# Patient Record
Sex: FEMALE | Race: WHITE | Employment: OTHER | URBAN - METROPOLITAN AREA
[De-identification: names, ages, dates, MRNs, and addresses within clinical notes are randomized per-mention and may not be internally consistent; named-entity substitution may affect disease eponyms.]

---

## 2017-03-03 ENCOUNTER — TRANSFERRED RECORDS (OUTPATIENT)
Dept: HEALTH INFORMATION MANAGEMENT | Facility: CLINIC | Age: 62
End: 2017-03-03

## 2017-03-03 LAB
CHOLEST SERPL-MCNC: 214 MG/DL (ref 0–199)
GLUCOSE SERPL-MCNC: 100 MG/DL (ref 70–100)
HDLC SERPL-MCNC: 69 MG/DL
HPV ABSTRACT: NORMAL
LDLC SERPL CALC-MCNC: 121 MG/DL (ref 19–130)
PAP-ABSTRACT: NORMAL
TRIGL SERPL-MCNC: 121 MG/DL (ref 4–149)

## 2018-03-20 ENCOUNTER — TELEPHONE (OUTPATIENT)
Dept: OTHER | Facility: CLINIC | Age: 63
End: 2018-03-20

## 2018-03-20 ENCOUNTER — TRANSFERRED RECORDS (OUTPATIENT)
Dept: HEALTH INFORMATION MANAGEMENT | Facility: CLINIC | Age: 63
End: 2018-03-20

## 2018-03-20 NOTE — TELEPHONE ENCOUNTER
3/20/2018    Call Regarding [employer] OTHER     Attempt 1    Message on voicemail     Comments:       Outreach   SB

## 2018-04-09 NOTE — TELEPHONE ENCOUNTER
4/9/2018    Call Regarding [employer] OTHER    Attempt 2    Message on voicemail     Comments:           Outreach   AT

## 2018-04-19 ENCOUNTER — TELEPHONE (OUTPATIENT)
Dept: FAMILY MEDICINE | Facility: CLINIC | Age: 63
End: 2018-04-19

## 2018-04-19 ENCOUNTER — OFFICE VISIT (OUTPATIENT)
Dept: FAMILY MEDICINE | Facility: CLINIC | Age: 63
End: 2018-04-19
Payer: COMMERCIAL

## 2018-04-19 VITALS
SYSTOLIC BLOOD PRESSURE: 120 MMHG | HEART RATE: 74 BPM | HEIGHT: 65 IN | TEMPERATURE: 98 F | OXYGEN SATURATION: 98 % | DIASTOLIC BLOOD PRESSURE: 70 MMHG | WEIGHT: 131 LBS | RESPIRATION RATE: 16 BRPM | BODY MASS INDEX: 21.83 KG/M2

## 2018-04-19 DIAGNOSIS — H53.8 CLOUDY VISION: ICD-10-CM

## 2018-04-19 DIAGNOSIS — M85.80 OSTEOPENIA, UNSPECIFIED LOCATION: ICD-10-CM

## 2018-04-19 DIAGNOSIS — L30.9 ECZEMA, UNSPECIFIED TYPE: ICD-10-CM

## 2018-04-19 DIAGNOSIS — M54.16 LUMBAR RADICULOPATHY: ICD-10-CM

## 2018-04-19 DIAGNOSIS — R42 DIZZINESS: ICD-10-CM

## 2018-04-19 DIAGNOSIS — Z12.4 SCREENING FOR MALIGNANT NEOPLASM OF CERVIX: ICD-10-CM

## 2018-04-19 DIAGNOSIS — Z11.59 NEED FOR HEPATITIS C SCREENING TEST: ICD-10-CM

## 2018-04-19 DIAGNOSIS — Z78.0 MENOPAUSE: ICD-10-CM

## 2018-04-19 DIAGNOSIS — Z00.00 ENCOUNTER FOR ROUTINE ADULT HEALTH EXAMINATION WITHOUT ABNORMAL FINDINGS: Primary | ICD-10-CM

## 2018-04-19 DIAGNOSIS — K59.09 CHRONIC CONSTIPATION: ICD-10-CM

## 2018-04-19 LAB
CHOLEST SERPL-MCNC: 218 MG/DL
GLUCOSE SERPL-MCNC: 92 MG/DL (ref 70–99)
HCV AB SERPL QL IA: NONREACTIVE
HDLC SERPL-MCNC: 75 MG/DL
HIV 1+2 AB+HIV1 P24 AG SERPL QL IA: NONREACTIVE
LDLC SERPL CALC-MCNC: 123 MG/DL
NONHDLC SERPL-MCNC: 143 MG/DL
TRIGL SERPL-MCNC: 101 MG/DL
TSH SERPL DL<=0.005 MIU/L-ACNC: 2.96 MU/L (ref 0.4–4)

## 2018-04-19 PROCEDURE — 86803 HEPATITIS C AB TEST: CPT | Performed by: FAMILY MEDICINE

## 2018-04-19 PROCEDURE — 84443 ASSAY THYROID STIM HORMONE: CPT | Performed by: FAMILY MEDICINE

## 2018-04-19 PROCEDURE — 80061 LIPID PANEL: CPT | Performed by: FAMILY MEDICINE

## 2018-04-19 PROCEDURE — 99396 PREV VISIT EST AGE 40-64: CPT | Performed by: FAMILY MEDICINE

## 2018-04-19 PROCEDURE — 36415 COLL VENOUS BLD VENIPUNCTURE: CPT | Performed by: FAMILY MEDICINE

## 2018-04-19 PROCEDURE — 87389 HIV-1 AG W/HIV-1&-2 AB AG IA: CPT | Performed by: FAMILY MEDICINE

## 2018-04-19 PROCEDURE — 82947 ASSAY GLUCOSE BLOOD QUANT: CPT | Performed by: FAMILY MEDICINE

## 2018-04-19 RX ORDER — TRIAMCINOLONE ACETONIDE 1 MG/G
CREAM TOPICAL
Qty: 80 G | Refills: 0 | Status: SHIPPED | OUTPATIENT
Start: 2018-04-19 | End: 2021-10-20

## 2018-04-19 NOTE — PATIENT INSTRUCTIONS
"Goal of 1200 mg calcium daily. Supplement what you are not getting in your diet.    Please call [facility] (formerly called [facility]) at [phone] to schedule dexa bone scan and with ophthalmologist.    Call your insurance to discuss coverage of Shingrix (shingles vaccine). Return for nurse only visit if you are covered.    Start powdered fiber such as Metamucil or Citricel: start 1 tsp per day, and increase by 1 tsp per week to goal total dosing of 1-2 tablespoons per day. Drink plenty of water daily for fiber to be effective.     Google \"Eply maneuver\" to help with dizziness.      Preventive Health Recommendations  Female Ages 50 - 64    Yearly exam: See your health care provider every year in order to  o Review health changes.   o Discuss preventive care.    o Review your medicines if your doctor has prescribed any.      Get a Pap test every three years (unless you have an abnormal result and your provider advises testing more often).    If you get Pap tests with HPV test, you only need to test every 5 years, unless you have an abnormal result.     You do not need a Pap test if your uterus was removed (hysterectomy) and you have not had cancer.    You should be tested each year for STDs (sexually transmitted diseases) if you're at risk.     Have a mammogram every 1 to 2 years.    Have a colonoscopy at age 50, or have a yearly FIT test (stool test). These exams screen for colon cancer.      Have a cholesterol test every 5 years, or more often if advised.    Have a diabetes test (fasting glucose) every three years. If you are at risk for diabetes, you should have this test more often.     If you are at risk for osteoporosis (brittle bone disease), think about having a bone density scan (DEXA).    Shots: Get a flu shot each year. Get a tetanus shot every 10 years.    Nutrition:     Eat at least 5 servings of fruits and vegetables each day.    Eat whole-grain " bread, whole-wheat pasta and brown rice instead of white grains and rice.    Talk to your provider about Calcium and Vitamin D.     Lifestyle    Exercise at least 150 minutes a week (30 minutes a day, 5 days a week). This will help you control your weight and prevent disease.    Limit alcohol to one drink per day.    No smoking.     Wear sunscreen to prevent skin cancer.     See your dentist every six months for an exam and cleaning.    See your eye doctor every 1 to 2 years.

## 2018-04-19 NOTE — TELEPHONE ENCOUNTER
RE: kenalog cream    Question on the area patient will be applying the cream  to calculate daily supply    Thank you     [phone]

## 2018-04-19 NOTE — MR AVS SNAPSHOT
"              After Visit Summary   4/19/2018    Елена Mix    MRN: [id number]           Patient Information     Date Of Birth          1955        Visit Information        Provider Department      4/19/2018 7:40 AM Jana Ramirez MD [facility]        Today's Diagnoses     Encounter for routine adult health examination without abnormal findings    -  1    Screening for malignant neoplasm of cervix        Need for hepatitis C screening test        Osteopenia, unspecified location        Dizziness        Chronic constipation        Lumbar radiculopathy        Cloudy vision        Eczema, unspecified type        Menopause          Care Instructions    Goal of 1200 mg calcium daily. Supplement what you are not getting in your diet.    Please call [facility] (formerly called [facility]) at [phone] to schedule dexa bone scan and with ophthalmologist.    Call your insurance to discuss coverage of Shingrix (shingles vaccine). Return for nurse only visit if you are covered.    Start powdered fiber such as Metamucil or Citricel: start 1 tsp per day, and increase by 1 tsp per week to goal total dosing of 1-2 tablespoons per day. Drink plenty of water daily for fiber to be effective.     Google \"Eply maneuver\" to help with dizziness.      Preventive Health Recommendations  Female Ages 50 - 64    Yearly exam: See your health care provider every year in order to  o Review health changes.   o Discuss preventive care.    o Review your medicines if your doctor has prescribed any.      Get a Pap test every three years (unless you have an abnormal result and your provider advises testing more often).    If you get Pap tests with HPV test, you only need to test every 5 years, unless you have an abnormal result.     You do not need a Pap test if your uterus was removed (hysterectomy) and you have not had cancer.    You should be tested each " year for STDs (sexually transmitted diseases) if you're at risk.     Have a mammogram every 1 to 2 years.    Have a colonoscopy at age 50, or have a yearly FIT test (stool test). These exams screen for colon cancer.      Have a cholesterol test every 5 years, or more often if advised.    Have a diabetes test (fasting glucose) every three years. If you are at risk for diabetes, you should have this test more often.     If you are at risk for osteoporosis (brittle bone disease), think about having a bone density scan (DEXA).    Shots: Get a flu shot each year. Get a tetanus shot every 10 years.    Nutrition:     Eat at least 5 servings of fruits and vegetables each day.    Eat whole-grain bread, whole-wheat pasta and brown rice instead of white grains and rice.    Talk to your provider about Calcium and Vitamin D.     Lifestyle    Exercise at least 150 minutes a week (30 minutes a day, 5 days a week). This will help you control your weight and prevent disease.    Limit alcohol to one drink per day.    No smoking.     Wear sunscreen to prevent skin cancer.     See your dentist every six months for an exam and cleaning.    See your eye doctor every 1 to 2 years.            Follow-ups after your visit        Additional Services     OPHTHALMOLOGY ADULT REFERRAL       Your provider has referred you to: [facility]: [facility] [place] [phone]   [email or web address]    Please be aware that coverage of these services is subject to the terms and limitations of your health insurance plan.  Call member services at your health plan with any benefit or coverage questions.      Please bring the following with you to your appointment:    (1) Any X-Rays, CTs or MRIs which have been performed.  Contact the facility where they were done to arrange for  prior to your scheduled appointment.    (2) List of current medications  (3) This referral request   (4)  "Any documents/labs given to you for this referral                  Future tests that were ordered for you today     Open Future Orders        Priority Expected Expires Ordered    DX Hip/Pelvis/Spine Routine  4/19/2019 4/19/2018            Who to contact     If you have questions or need follow up information about today's clinic visit or your schedule please contact [facility] directly at [phone].  Normal or non-critical lab and imaging results will be communicated to you by brick&mobilehart, letter or phone within 4 business days after the clinic has received the results. If you do not hear from us within 7 days, please contact the clinic through [employer]t or phone. If you have a critical or abnormal lab result, we will notify you by phone as soon as possible.  Submit refill requests through [employer] or call your pharmacy and they will forward the refill request to us. Please allow 3 business days for your refill to be completed.          Additional Information About Your Visit        brick&mobilehart Information     [employer] gives you secure access to your electronic health record. If you see a primary care provider, you can also send messages to your care team and make appointments. If you have questions, please call your primary care clinic.  If you do not have a primary care provider, please call [phone] and they will assist you.        Care EveryWhere ID     This is your Care EveryWhere ID. This could be used by other organizations to access your [facility] medical records  [id number]        Your Vitals Were     Pulse Temperature Respirations Height Pulse Oximetry Breastfeeding?    74 98  F (36.7  C) (Oral) 16 1.651 m (5' 5\") 98% No    BMI (Body Mass Index)                   21.8 kg/m2            Blood Pressure from Last 3 Encounters:   04/19/18 120/70   04/26/16 (!) 153/103   04/12/16 135/83    Weight from Last 3 Encounters:   04/19/18 59.4 kg (131 lb)   04/12/16 59 kg (130 lb)   02/18/16 59 kg (130 lb) "              We Performed the Following     Glucose     Hepatitis C Screen Reflex to HCV RNA Quant and Genotype     HIV Antigen Antibody Combo     Lipid panel reflex to direct LDL Fasting     OPHTHALMOLOGY ADULT REFERRAL     TSH with free T4 reflex          Today's Medication Changes          These changes are accurate as of 4/19/18  8:40 AM.  If you have any questions, ask your nurse or doctor.               Start taking these medicines.        Dose/Directions    triamcinolone 0.1 % cream   Commonly known as:  KENALOG   Used for:  Eczema, unspecified type   Started by:  Jana Ramirez MD        Apply sparingly to affected area three times daily as needed   Quantity:  80 g   Refills:  0            Where to get your medicines      These medications were sent to [facility] [address]  [address] [facility] [address]     Phone:  [phone]     triamcinolone 0.1 % cream                Primary Care Provider Office Phone # Fax #    Jana Ramirez [phone] [address]  [facility] [address]        Equal Access to Services     [facility]: Hadii aad ku hadasho Soomaali, waaxda luqadaha, qaybta kaalmada adeegyada, waxtessa gao . So [facility] [phone].    ATENCIÓN: Si habla español, tiene a smith disposición servicios gratuitos de asistencia lingüística. [facility] [phone].    We comply with applicable federal civil rights laws and [place] laws. We do not discriminate on the basis of race, color, national origin, age, disability, sex, sexual orientation, or gender identity.            Thank you!     Thank you for choosing [facility]  for your care. Our goal is always to provide you with excellent care. Hearing back from our patients is one way we can continue to improve our services. Please take a few minutes to complete the written survey that you may receive in the  mail after your visit with us. Thank you!             Your Updated Medication List - Protect others around you: Learn how to safely use, store and throw away your medicines at www.disposemymeds.org.          This list is accurate as of 4/19/18  8:40 AM.  Always use your most recent med list.                   Brand Name Dispense Instructions for use Diagnosis    DAILY MULTIVITAMIN PO           fluticasone 50 MCG/ACT spray    FLONASE     Spray 2 sprays into both nostrils as needed for rhinitis or allergies        triamcinolone 0.1 % cream    KENALOG    80 g    Apply sparingly to affected area three times daily as needed    Eczema, unspecified type       VITAMIN D3 PO      Take by mouth daily

## 2018-04-19 NOTE — TELEPHONE ENCOUNTER
"Called and spoke with pharmacy. They report due to insurance purposes they need to know what parts of the body patient is applying the Kenalog cream to. Writer explained it does not specify specific body parts/areas that patient is applying to, just \"apply to affected areas sparingly\". They report that they will call patient to ask what parts of her body she is applying to. They have no further questions or concerns and reporting that [facility] does not need to do anything further. Closing encounter.    Vonda Maldonado RN    "

## 2018-04-19 NOTE — PROGRESS NOTES
SUBJECTIVE:   CC: Елена Mix is an 62 year old woman who presents for preventive health visit.     Healthy Habits:  Answers for HPI/ROS submitted by the patient on 4/17/2018   Annual Exam:  Getting at least 3 servings of Calcium per day:: Yes  Bi-annual eye exam:: Yes  Dental care twice a year:: Yes  Sleep apnea or symptoms of sleep apnea:: Daytime drowsiness  Diet:: Regular (no restrictions)  Frequency of exercise:: 4-5 days/week  Taking medications regularly:: Not Applicable  Medication side effects:: Not applicable  Additional concerns today:: YES  PHQ-2 Score: 0  Duration of exercise:: 15-30 minutes      Pt was following with Rosa Hilton over the last year due to insurance change. Last physical was 3/3/17- reviewed note with patient through care everywhere.     Past/recent records reviewed and discussed for --   -Colonoscopy completed 2016. Due for in f/u 5 years (2021)  -She completed mammogram in March 2018.  -vaccines. Pt [facility] unsure of when but plans to call and ask for records.  -previous Glucose was 100. Denies family hx of diabetes. Pt admits to eating a higher amount of carbs.   -Menopause at age 41.    BP: pt reports with last physical she was very stressed which is likely why BP was elevated. She checked her BP daily for the following 10 days and noticed it was not elevated. Her job is very stressful as well and thinks BP might be elevated at work. She is planning to retire soon.  BP Readings from Last 3 Encounters:   04/19/18 120/70   04/26/16 (!) 153/103   04/12/16 135/83       Weight: exercising 4-5 days per week. She thinks this will increase in the summer.   Wt Readings from Last 5 Encounters:   04/19/18 59.4 kg (131 lb)   04/12/16 59 kg (130 lb)   02/18/16 59 kg (130 lb)   09/21/15 59.4 kg (130 lb 14.4 oz)   02/10/15 59.9 kg (132 lb)       GI: stress causes changes in bm's-constipated. To manage constipation she increases veggies  "in diet and drinks 1-2 \"mugs\" of herbal tea which stimulated bm's.    Skin: requests refill of steroid cream last prescribed several years ago. pt has had issues with dry skin that cause itching. She was given triamcinolone acetonide cream (USP 0.1%, 80 mg) which helps with sx's. Pt has hx of eczema. She also has significant amount of swelling and itching with mosquito bites and this cream helps with that.    Others:   -Back pain is okay. She followed with PT and is doing home exercises regularly. Also doing yoga which helps.   -Has developed floaters and cloudy vision. Last eye apt was with optometry and she would like referral for ophthalmology.   -Feels dizzy (off-balance, spinning) during certain movements during yoga (usually with turning her head. Will have to stop and pause for a second and the symptoms resolve.  -Supplementing multivitamin and vit D. She is drinking milk and eating Greek yogurt and cheese regularly.   -Drinking 2 glasses of wine per night after work due to stress.  -Allergies are well controlled with Flonase.         Today's PHQ-2 Score:   PHQ-2 ( 1999 Pfizer) 4/17/2018 9/21/2015   Q1: Little interest or pleasure in doing things 0 0   Q2: Feeling down, depressed or hopeless 0 0   PHQ-2 Score 0 0   Q1: Little interest or pleasure in doing things Not at all -   Q2: Feeling down, depressed or hopeless Not at all -   PHQ-2 Score 0 -       Abuse: Current or Past(Physical, Sexual or Emotional)- No  Do you feel safe in your environment - Yes    Social History   Substance Use Topics     Smoking status: Never Smoker     Smokeless tobacco: Never Used     Alcohol use Yes      Comment: 1-2 glasses wine in evening, approx 5 x's a week     If you drink alcohol do you typically have >3 drinks per day or >7 drinks per week? 1-2 glass a day.                      Reviewed orders with patient.  Reviewed health maintenance and updated orders accordingly - Yes  Labs reviewed in EPIC  BP Readings from Last 3 " Encounters:   04/19/18 120/70   04/26/16 (!) 153/103   04/12/16 135/83    Wt Readings from Last 3 Encounters:   04/19/18 59.4 kg (131 lb)   04/12/16 59 kg (130 lb)   02/18/16 59 kg (130 lb)                  Patient Active Problem List   Diagnosis     Osteopenia     Seasonal allergic rhinitis     Chronic constipation     Lumbar radiculopathy     Advanced directives, counseling/discussion     CARDIOVASCULAR SCREENING; LDL GOAL LESS THAN 130     Eczema, unspecified type     Past Surgical History:   Procedure Laterality Date     COLONOSCOPY N/A 2/26/2016    Procedure: COLONOSCOPY;  Surgeon: Mar Frye MD;  Location: [facility] OR     COLONOSCOPY WITH CO2 INSUFFLATION N/A 2/26/2016    Procedure: COLONOSCOPY WITH CO2 INSUFFLATION;  Surgeon: Mar Frye MD;  Location: [facility] OR     SHOULDER SURGERY  1985?    right shoulder, instability      TONSILLECTOMY & ADENOIDECTOMY  age 5     TUBAL LIGATION         Social History   Substance Use Topics     Smoking status: Never Smoker     Smokeless tobacco: Never Used     Alcohol use Yes      Comment: 1-2 glasses wine in evening, approx 5 x's a week     Family History   Problem Relation Age of Onset     Respiratory Mother      emphysema      HEART DISEASE Father      cardiomyopathy, thought not heart attack.      Arthritis Brother      rheumatoid     Hyperlipidemia Brother      Arthritis Sister      osteoarthritis     Hypertension Sister      Hyperlipidemia Sister            Patient over age 50, mutual decision to screen reflected in health maintenance.    Pertinent mammograms are reviewed under the imaging tab.  History of abnormal Pap smear: NO - age 30-65 PAP every 5 years with negative HPV co-testing recommended    Reviewed and updated as needed this visit by clinical staff  Tobacco  Allergies  Meds  Problems  Med Hx  Surg Hx  Fam Hx  Soc Hx          Reviewed and updated as needed this visit by Provider Tobacco  Allergies  Meds  Med Hx  Surg Hx  Fam Hx  Soc Hx  "  Allergies  Meds  Problems            ROS:   ROS: 10 point ROS neg other than the symptoms noted above in the HPI.    This document serves as a record of the services and decisions personally performed and made by Jana Ramirez MD. It was created on her behalf by Camelia Guajardo, a trained medical scribe. The creation of this document is based the provider's statements to the medical scribe.  Camelia Guajardo April 19, 2018 7:58 AM      OBJECTIVE:   /70 (BP Location: Right arm, Patient Position: Sitting, Cuff Size: Adult Regular)  Pulse 74  Temp 98  F (36.7  C) (Oral)  Resp 16  Ht 1.651 m (5' 5\")  Wt 59.4 kg (131 lb)  SpO2 98%  Breastfeeding? No  BMI 21.8 kg/m2  EXAM:  GENERAL APPEARANCE: healthy, alert and no distress  EYES: Eyes grossly normal to inspection, PERRL and conjunctivae and sclerae normal  HENT: ear canals and TM's normal, nose and mouth without ulcers or lesions, oropharynx clear and oral mucous membranes moist  NECK: no adenopathy, no asymmetry, masses, or scars and thyroid normal to palpation  RESP: lungs clear to auscultation - no rales, rhonchi or wheezes  BREAST: normal without masses, tenderness or nipple discharge and no palpable axillary masses or adenopathy  CV: regular rate and rhythm, normal S1 S2, no S3 or S4, no murmur, click or rub, no peripheral edema and peripheral pulses strong  ABDOMEN: soft, nontender, no hepatosplenomegaly, no masses and bowel sounds normal   (female): normal female external genitalia, normal urethral meatus, vaginal mucosal atrophy noted, normal cervix, adnexae, and uterus without masses or abnormal discharge  SKIN: no suspicious lesions or rashes  PSYCH: mentation appears normal and affect normal/bright    No results found for this or any previous visit (from the past 24 hour(s)).    Progress Notes  - in this encounter    Table of Contents for Progress Notes  Yaneli Burrell RN - 03/13/2017 10:04 AM CDT  Annie Coelho MD - " 03/03/2017 12:16 PM Annie Mckeon MD - 02/28/2017 10:04 AM Yaneli Osborn RN - 03/13/2017 10:04 AM CDT  Quick Note:       Dear Enedelia,     I am writing to let you know that your Pap and HPV result is negative. This means that your test result was normal. No cancer or pre-cancerous cells were seen.     Based on current cervical cancer screening recommendations, your next Pap and HPV should be in 3 years.     Continue to schedule your annual preventive exams for your overall health.     If you have questions about cervical cancer screening or your test results, call [phone].     Sincerely,     Yaneli Burrell, RN on behalf of Dr. Salome Moore, Medical Director [facility]erv[facility]   ______       Quick Note:     [facility] Summary:Risk for a heart attack or stroke in the next 10 years is 4.96% (using the ACC/AHA ASCVD risk score) based on age 61, female gender, /88 mm Hg, not on BP medication, total cholesterol 214 mg/dl, HDL 69 mg/dl, diabetes diagnosis not identified, current tobacco use not identified. Cardiovascular risk could be reduced with attention to the following, in order of priority: lowering blood pressure;     ______       Annie Coelho MD - 02/28/2017 10:04 AM CST  Formatting of this note may be different from the original.  Preventive Exam & Pelvic     SUBJECTIVE:   61 y.o.female presents for a routine preventive physical exam.    Concerns:    Osteopenia  DEXA: 5/14  Lumbar -1.6, total hip -1.6, femoral neck -1.6/-1.7  FRAX 8%-major osteoporotic fracture, 0.9% hip  Previously on fosamax Q2rehuj d/c in 2014.   On ca/vit d, exercises with resistance training, not a smoke.     RLE radiculopathy--LD-S1 epidural steroid injection done 4/26/16  -with exercise and pilates not having issues. Some numbness chronically at lateral right foot.   MRI 4/16  -sciatica/LBP, right leg pain, right foot numbness  L1-L5 mild to minimal disc bulge  Mild  "multilevel degenerative disc disease, no acute disc protrusion/extrusion  Subtle left paracental and foraminal annular tear and mild protrusion without significant foraminal compromise at L5/S1    BP  Outside of the office 120-130s, never been told bp elevated  Under a Lot of stress at work.     Insomnia  After return from [place] 3 weeks ago  Averaging 3 hours of sleep/night for a couple weeks. Waking frequently, difficulty staying asleep  Originally tried OTC sleep aid--made dizzy, groggy  Worked on sleep hygeine, started melatonin--resolved; doing some meditation  Stress level improved right now.     GYN    Menopause in   No h/o abnormal pap  Last pap .     Allergies   Allergen Reactions     Other     No outpatient prescriptions prior to visit.     No facility-administered medications prior to visit.     Review of Systems:   Other than what is mentioned above, the remainder of complete review of systems is negative.    OBJECTIVE:   Vital Signs: /88 mmHg  Pulse 67  Ht 5' 4.5\" (163.8 cm)  Wt 126 lb (59443 g)  BMI 21.30 kg/m2 Estimated body mass index is 21.3 kg/(m^2) as calculated from the following:  Height as of this encounter: 5' 4.5\" (163.8 cm).  Weight as of this encounter: 126 lb (35570 g).    General: Patient alert, in NAD.   HEENT: PERRLA. Bilateral TM's, external canals, oropharynx normal.   Neck: Supple, without thyromegaly or mass.   Upper Extremities: FROM with good strength, no lesions or deformities.   CV: RRR without murmurs, rubs or gallops.   Resp: Clear to auscultation without crackles, wheezes or distress.   Abdomen: Soft, non-tender, without hepatosplenomegaly, masses, or hernias.   Breasts: Symmetrical, nontender, without masses or nipple discharge.   Lymphatic: No neck, supraclavicular, axillary or groin lymphadenopathy.   Lower Extremities: FROM, normal gait without edema, lesions, or deformity.   Pelvic: External normal without lesions. Vagina reveals healthy mucosa with " no vaginal or cervical lesions, and no abnormal discharge. Pap collected. Bimanual reveals uterus to be mobile, normal size, shape and consistency. No adenexal masses or tenderness.  Rectal: Not examined.   Skin: No lesions.   Neuro: CN II-XII, motor & sensory function all intact.   Psychiatric: Alert & oriented with normal affect and insight. Patient does not appear depressed or anxious.     ASSESSMENT:   Routine preventive exam.    PLAN:   Follow-up in 1 year, sooner PRN any concerns.  Prescriptions provided, see OP Med list on Health Profile in Epic Medication List.    Screening Labs:   Cholesterol, glucose    Preventive health counseling provided:   Recent ACOG Pap smear guideline changes reviewed with patient. Pap smear done. Last Pap smear done 4/13     Mammography screening: ordered    Colon cancer screening: Colonoscopy 2/16--polyp, repeat 5 years    BMD: repeat DEXA in 2-3 years; osteopenia-discussed ca/vit D, exercise    Calcium intake reviewed. Strategies to achieve 1200 to 1500 milligrams of calcium intake per day reviewed.  Regular exercise and healthy diet encouraged.    Insomnia  Discussed sleep hygiene, melatonin, meditation.    Annie Coelho MD      Plan of Treatment  - as of this encounter    ASSESSMENT/PLAN:   1. Encounter for routine adult health examination without abnormal findings  - HIV Antigen Antibody Combo  - Lipid panel reflex to direct LDL Fasting  - Glucose    2. Screening for malignant neoplasm of cervix  utd    3. Need for hepatitis C screening test  - Hepatitis C Screen Reflex to HCV RNA Quant and Genotype    4. Osteopenia, unspecified location   reviewed goal of 1200 mg calcium daily. Pt is to f/u with dexa scan  - TSH with free T4 reflex  - DX Hip/Pelvis/Spine; Future    5. Dizziness   suspect positional vertigo. Advised trial Eply maneuver at home. If this does not help pt is to return for problem focused visit.  - TSH with free T4 reflex    6. Chronic constipation    "advised starting daily fiber     7. Lumbar radiculopathy   controlled with home PT and yoga    8. Cloudy vision  pt is to f/u with ophthalmology.   - OPHTHALMOLOGY ADULT REFERRAL    9. Eczema, unspecified type   Controlled. Continue same medication.   - triamcinolone (KENALOG) 0.1 % cream; Apply sparingly to affected area three times daily as needed  Dispense: 80 g; Refill: 0    10. Menopause  as above #4. Pt is to f/u with dexa scan.  - DX Hip/Pelvis/Spine; Future    Discussed shingrix vaccine with pt. They will return later this year to receive the vaccine.      Patient Instructions   Goal of 1200 mg calcium daily. Supplement what you are not getting in your diet.    Please call [facility] ([facility] at [phone] to schedule dexa bone scan and with ophthalmologist.    Call your insurance to discuss coverage of Shingrix (shingles vaccine). Return for nurse only visit if you are covered.    Start powdered fiber such as Metamucil or Citricel: start 1 tsp per day, and increase by 1 tsp per week to goal total dosing of 1-2 tablespoons per day. Drink plenty of water daily for fiber to be effective.     Google \"Eply maneuver\" to help with dizziness.      Preventive Health Recommendations  Female Ages 50 - 64    Yearly exam: See your health care provider every year in order to  o Review health changes.   o Discuss preventive care.    o Review your medicines if your doctor has prescribed any.      Get a Pap test every three years (unless you have an abnormal result and your provider advises testing more often).    If you get Pap tests with HPV test, you only need to test every 5 years, unless you have an abnormal result.     You do not need a Pap test if your uterus was removed (hysterectomy) and you have not had cancer.    You should be tested each year for STDs (sexually transmitted diseases) if you're at risk.     Have a mammogram every 1 to 2 " "years.    Have a colonoscopy at age 50, or have a yearly FIT test (stool test). These exams screen for colon cancer.      Have a cholesterol test every 5 years, or more often if advised.    Have a diabetes test (fasting glucose) every three years. If you are at risk for diabetes, you should have this test more often.     If you are at risk for osteoporosis (brittle bone disease), think about having a bone density scan (DEXA).    Shots: Get a flu shot each year. Get a tetanus shot every 10 years.    Nutrition:     Eat at least 5 servings of fruits and vegetables each day.    Eat whole-grain bread, whole-wheat pasta and brown rice instead of white grains and rice.    Talk to your provider about Calcium and Vitamin D.     Lifestyle    Exercise at least 150 minutes a week (30 minutes a day, 5 days a week). This will help you control your weight and prevent disease.    Limit alcohol to one drink per day.    No smoking.     Wear sunscreen to prevent skin cancer.     See your dentist every six months for an exam and cleaning.    See your eye doctor every 1 to 2 years.          COUNSELING:   Reviewed preventive health counseling, as reflected in patient instructions       Regular exercise       Healthy diet/nutrition       Vision screening       Hearing screening       Immunizations         Alcohol Use       Osteoporosis Prevention/Bone Health       Colon cancer screening       Consider Hep C screening for patients born between 1945 and 1965       HIV screeninx in teen years, 1x in adult years, and at intervals if high risk         reports that she has never smoked. She has never used smokeless tobacco.    Estimated body mass index is 21.8 kg/(m^2) as calculated from the following:    Height as of this encounter: 1.651 m (5' 5\").    Weight as of this encounter: 59.4 kg (131 lb).       Counseling Resources:  ATP IV Guidelines  Pooled Cohorts Equation Calculator  Breast Cancer Risk Calculator  FRAX Risk Assessment  ICSI " Preventive Guidelines  Dietary Guidelines for Americans, 2010  USDA's MyPlate  ASA Prophylaxis  Lung CA Screening    Length of visit was 28 minutes with more than 50 percent of that time used for discussing medical concerns and education    The information in this document, created by the medical scribe for me, accurately reflects the services I personally performed and the decisions made by me. I have reviewed and approved this document for accuracy.   MD Jana Crawford MD  [facility]

## 2018-04-19 NOTE — Clinical Note
Please abstract the following data from this visit with this patient into the appropriate field in Epic:  Pap smear done on this date: 3/3/17 (approximately), by this group: [facility], results were normal.

## 2018-05-04 ENCOUNTER — ALLIED HEALTH/NURSE VISIT (OUTPATIENT)
Dept: NURSING | Facility: CLINIC | Age: 63
End: 2018-05-04
Payer: COMMERCIAL

## 2018-05-04 DIAGNOSIS — Z23 NEED FOR ZOSTAVAX ADMINISTRATION: Primary | ICD-10-CM

## 2018-05-04 PROCEDURE — 99207 ZZC NO CHARGE NURSE ONLY: CPT

## 2018-05-04 PROCEDURE — 90471 IMMUNIZATION ADMIN: CPT

## 2018-05-04 PROCEDURE — 90750 HZV VACC RECOMBINANT IM: CPT

## 2018-05-04 NOTE — NURSING NOTE
Screening Questionnaire for Adult Immunization    Are you sick today?   No   Do you have allergies to medications, food, a vaccine component or latex?   No   Have you ever had a serious reaction after receiving a vaccination?   No   Do you have a long-term health problem with heart disease, lung disease, asthma, kidney disease, metabolic disease (e.g. diabetes), anemia, or other blood disorder?   No   Do you have cancer, leukemia, HIV/AIDS, or any other immune system problem?   No   In the past 3 months, have you taken medications that affect  your immune system, such as prednisone, other steroids, or anticancer drugs; drugs for the treatment of rheumatoid arthritis, Crohn s disease, or psoriasis; or have you had radiation treatments?   No   Have you had a seizure, or a brain or other nervous system problem?   No   During the past year, have you received a transfusion of blood or blood     products, or been given immune (gamma) globulin or antiviral drug?   No   For women: Are you pregnant or is there a chance you could become        pregnant during the next month?   No   Have you received any vaccinations in the past 4 weeks?   No     Immunization questionnaire answers were all negative.   Patient states having seasonal allergies, but not the common cold.     Patient instructed to remain in clinic for 15 minutes afterwards, and to report any adverse reaction to me immediately.       Screening performed by Camille Evans on 5/4/2018 at 9:01 AM.

## 2018-05-04 NOTE — MR AVS SNAPSHOT
After Visit Summary   5/4/2018    Елена Mix    MRN: [id number]           Patient Information     Date Of Birth          1955        Visit Information        Provider Department      5/4/2018 8:40 AM [facility]        Today's Diagnoses     Need for Zostavax administration    -  1       Follow-ups after your visit        Your next 10 appointments already scheduled     Jun 12, 2018  9:15 AM CDT   New Visit with Marv Bolaños MD,  [facility] NURSE ONLY   [facility]    [id number] [place] [id number] [phone]              Who to contact     If you have questions or need follow up information about today's clinic visit or your schedule please contact [facility] directly at [phone].  Normal or non-critical lab and imaging results will be communicated to you by MyChart, letter or phone within 4 business days after the clinic has received the results. If you do not hear from us within 7 days, please contact the clinic through MyChart or phone. If you have a critical or abnormal lab result, we will notify you by phone as soon as possible.  Submit refill requests through [employer] or call your pharmacy and they will forward the refill request to us. Please allow 3 business days for your refill to be completed.          Additional Information About Your Visit        MyChart Information     [employer] gives you secure access to your electronic health record. If you see a primary care provider, you can also send messages to your care team and make appointments. If you have questions, please call your primary care clinic.  If you do not have a primary care provider, please call [phone] and they will assist you.        Care EveryWhere ID     This is your Care EveryWhere ID. This could be used by other organizations to access your [place] medical records  [id number]          Blood Pressure from Last 3 Encounters:   04/19/18 120/70   04/26/16 (!) 153/103   04/12/16 135/83    Weight from Last 3 Encounters:   04/19/18 59.4 kg (131 lb)   04/12/16 59 kg (130 lb)   02/18/16 59 kg (130 lb)              We Performed the Following     VACCINE ADMINISTRATION, INITIAL     ZOSTER VACCINE RECOMBINANT ADJUVANTED IM NJX (SHINGRIX)        Primary Care Provider Office Phone # Fax #    Jana Ramirez [phone] [address]  [facility] [address]        Equal Access to Services     [facility]: Hadii hermann Hicks, watommieda hgazal, qaybta kaalmacece mckinney, peter gao . So [facility] [phone].    ATENCIÓN: Si habla español, tiene a smith disposición servicios gratuitos de asistencia lingüística. Ll[facility] [phone].    We comply with applicable federal civil rights laws and [place] laws. We do not discriminate on the basis of race, color, national origin, age, disability, sex, sexual orientation, or gender identity.            Thank you!     Thank you for choosing [facility]  for your care. Our goal is always to provide you with excellent care. Hearing back from our patients is one way we can continue to improve our services. Please take a few minutes to complete the written survey that you may receive in the mail after your visit with us. Thank you!             Your Updated Medication List - Protect others around you: Learn how to safely use, store and throw away your medicines at www.disposemymeds.org.          This list is accurate as of 5/4/18  9:03 AM.  Always use your most recent med list.                   Brand Name Dispense Instructions for use Diagnosis    DAILY MULTIVITAMIN PO           fluticasone 50 MCG/ACT spray    FLONASE     Spray 2 sprays into both nostrils as needed for rhinitis or allergies        triamcinolone 0.1 % cream    KENALOG    80 g    Apply sparingly to affected area three times  daily as needed    Eczema, unspecified type       VITAMIN D3 PO      Take by mouth daily

## 2018-06-12 ENCOUNTER — OFFICE VISIT (OUTPATIENT)
Dept: OPHTHALMOLOGY | Facility: CLINIC | Age: 63
End: 2018-06-12
Attending: FAMILY MEDICINE
Payer: COMMERCIAL

## 2018-06-12 DIAGNOSIS — H26.9 NUCLEAR CATARACT, NONSENILE: Primary | ICD-10-CM

## 2018-06-12 PROCEDURE — 92004 COMPRE OPH EXAM NEW PT 1/>: CPT | Performed by: OPHTHALMOLOGY

## 2018-06-12 ASSESSMENT — CONF VISUAL FIELD
METHOD: COUNTING FINGERS
OD_NORMAL: 1
OS_NORMAL: 1

## 2018-06-12 ASSESSMENT — VISUAL ACUITY
CORRECTION_TYPE: GLASSES
OS_CC+: -2
OD_CC+: -1
OS_CC: 20/20
METHOD: SNELLEN - LINEAR
OD_CC: 20/20

## 2018-06-12 ASSESSMENT — EXTERNAL EXAM - LEFT EYE: OS_EXAM: NORMAL

## 2018-06-12 ASSESSMENT — TONOMETRY
IOP_METHOD: ICARE
OS_IOP_MMHG: 16
OD_IOP_MMHG: 16

## 2018-06-12 ASSESSMENT — CUP TO DISC RATIO
OS_RATIO: 0.35
OD_RATIO: 0.35

## 2018-06-12 ASSESSMENT — EXTERNAL EXAM - RIGHT EYE: OD_EXAM: NORMAL

## 2018-06-12 ASSESSMENT — SLIT LAMP EXAM - LIDS
COMMENTS: NORMAL
COMMENTS: NORMAL

## 2018-06-12 NOTE — NURSING NOTE
Patient presents with:  Eye Problem Left Eye: cloudy patch of vision OS [id number]      Referring Provider:  Jana Ramirez MD  [address]  [place] [address]    HPI    Last Eye Exam:  2/1/18   Informant(s):  pt   Affected eye(s):  Left   Symptoms:     Blurred vision   Decreased vision   Ghost images      Duration:  4 months   Frequency:  Constant       Do you have eye pain now?:  No      Comments:  Cloudy patch in VA OS   Superior and inferior peripheral and is stationary  Ghosting images when looks into light  started after eyes were dilated during routine eye exam at [facility] early Feb             Faiza Green, COA

## 2018-06-12 NOTE — MR AVS SNAPSHOT
After Visit Summary   6/12/2018    Елена Mix    MRN: [id number]           Patient Information     Date Of Birth          1955        Visit Information        Provider Department      6/12/2018 9:15 AM Marv Bolaños MD;  OPH NURSE ONLY [facility]        Today's Diagnoses     Nuclear cataract, nonsenile    -  1       Follow-ups after your visit        Who to contact     If you have questions or need follow up information about today's clinic visit or your schedule please contact [facility] directly at [phone].  Normal or non-critical lab and imaging results will be communicated to you by Kivrahart, letter or phone within 4 business days after the clinic has received the results. If you do not hear from us within 7 days, please contact the clinic through Kivrahart or phone. If you have a critical or abnormal lab result, we will notify you by phone as soon as possible.  Submit refill requests through Sonics or call your pharmacy and they will forward the refill request to us. Please allow 3 business days for your refill to be completed.          Additional Information About Your Visit        MyChart Information     Sonics gives you secure access to your electronic health record. If you see a primary care provider, you can also send messages to your care team and make appointments. If you have questions, please call your primary care clinic.  If you do not have a primary care provider, please call [phone] and they will assist you.      Sonics is an electronic gateway that provides easy, online access to your medical records. With Sonics, you can request a clinic appointment, read your test results, renew a prescription or communicate with your care team.     To access your existing account, please contact your [facility] or call [phone] for assistance.        Care EveryWhere ID     This is your  Care EveryWhere ID. This could be used by other organizations to access your [place] medical records  [id number]         Blood Pressure from Last 3 Encounters:   04/19/18 120/70   04/26/16 (!) 153/103   04/12/16 135/83    Weight from Last 3 Encounters:   04/19/18 59.4 kg (131 lb)   04/12/16 59 kg (130 lb)   02/18/16 59 kg (130 lb)              We Performed the Following     EYE EXAM, NEW PATIENT,COMPREHES        Primary Care Provider Office Phone # Fax #    Jana Arin Ramirez [phone] [address]  [place] [address]        Equal Access to Services     ANIKA GILES : Hadii hermann chavez hadnazanino Sobrennen, waaxda luqadaha, qaybta kaalmada adeegyada, peter gao . So [place] [phone].    ATENCIÓN: Si habla español, tiene a smith disposición servicios gratuitos de asistencia lingüística. Llame al [phone].    We comply with applicable federal civil rights laws and [place] laws. We do not discriminate on the basis of race, color, national origin, age, disability, sex, sexual orientation, or gender identity.            Thank you!     Thank you for choosing [facility]  for your care. Our goal is always to provide you with excellent care. Hearing back from our patients is one way we can continue to improve our services. Please take a few minutes to complete the written survey that you may receive in the mail after your visit with us. Thank you!             Your Updated Medication List - Protect others around you: Learn how to safely use, store and throw away your medicines at www.disposemymeds.org.          This list is accurate as of 6/12/18 10:15 AM.  Always use your most recent med list.                   Brand Name Dispense Instructions for use Diagnosis    DAILY MULTIVITAMIN PO           fluticasone 50 MCG/ACT spray    FLONASE     Spray 2 sprays into both nostrils as needed for rhinitis or allergies        triamcinolone 0.1 %  cream    KENALOG    80 g    Apply sparingly to affected area three times daily as needed    Eczema, unspecified type       VITAMIN D3 PO      Take by mouth daily

## 2018-07-19 NOTE — PROGRESS NOTES
Assessment & Plan   Елена Mix is a 62 year old female who presents with:   Review of systems for the eyes was negative other than the pertinent positives and negatives noted in the HPI.    Nuclear cataract, nonsenile  - Good vision.   - Observe    Return in 1 year for annual.      Attending Physician Attestation:  Complete documentation of historical and exam elements from today's encounter can be found in the full encounter summary report (not reduplicated in this progress note).  I personally obtained the chief complaint(s) and history of present illness.  I confirmed and edited as necessary the review of systems, past medical/surgical history, family history, social history, and examination findings as documented by others; and I examined the patient myself.  I personally reviewed the relevant tests, images, and reports as documented above.  I formulated and edited as necessary the assessment and plan and discussed the findings and management plan with the patient and family. - Marv Bolaños MD    
negative - no fever

## 2018-08-22 ENCOUNTER — TRANSFERRED RECORDS (OUTPATIENT)
Dept: HEALTH INFORMATION MANAGEMENT | Facility: CLINIC | Age: 63
End: 2018-08-22

## 2019-01-03 ENCOUNTER — MYC MEDICAL ADVICE (OUTPATIENT)
Dept: FAMILY MEDICINE | Facility: CLINIC | Age: 64
End: 2019-01-03

## 2019-01-03 ENCOUNTER — TELEPHONE (OUTPATIENT)
Dept: FAMILY MEDICINE | Facility: CLINIC | Age: 64
End: 2019-01-03

## 2019-01-03 DIAGNOSIS — L98.9 SKIN LESION: Primary | ICD-10-CM

## 2019-01-03 NOTE — TELEPHONE ENCOUNTER
Reason for Call:  Other     Detailed comments: Please FAX order for Bone scan to  [facility] on [address]. Please call her when done so she can make the appointment.    Phone Number Patient can be reached at: Cell number on file:    Telephone Information:   Mobile [phone]     Best Time: any    Can we leave a detailed message on this number? YES    Call taken on 1/3/2019 at 10:41 AM by Ju Mckeon

## 2019-01-03 NOTE — TELEPHONE ENCOUNTER
Faxed to [facility] at [phone].   Pt informed      Vale STEVENS (R))

## 2019-01-07 ENCOUNTER — TRANSFERRED RECORDS (OUTPATIENT)
Dept: HEALTH INFORMATION MANAGEMENT | Facility: CLINIC | Age: 64
End: 2019-01-07

## 2019-03-05 ENCOUNTER — MYC MEDICAL ADVICE (OUTPATIENT)
Dept: FAMILY MEDICINE | Facility: CLINIC | Age: 64
End: 2019-03-05

## 2019-04-09 ENCOUNTER — TRANSFERRED RECORDS (OUTPATIENT)
Dept: HEALTH INFORMATION MANAGEMENT | Facility: CLINIC | Age: 64
End: 2019-04-09

## 2019-06-28 ENCOUNTER — DOCUMENTATION ONLY (OUTPATIENT)
Dept: FAMILY MEDICINE | Facility: CLINIC | Age: 64
End: 2019-06-28

## 2019-06-28 DIAGNOSIS — Z00.00 ENCOUNTER FOR ROUTINE ADULT HEALTH EXAMINATION WITHOUT ABNORMAL FINDINGS: Primary | ICD-10-CM

## 2019-06-28 NOTE — PROGRESS NOTES
This patient is scheduled for lab work on 73/3/2019 but does not qualify for pre-visit protocol. Please place future orders, or have your care team call and advise patient to cancel lab appointment. She has an appointment with you on 7/10/2019.    Thank you,    [facility]

## 2019-07-03 DIAGNOSIS — Z00.00 ENCOUNTER FOR ROUTINE ADULT HEALTH EXAMINATION WITHOUT ABNORMAL FINDINGS: ICD-10-CM

## 2019-07-03 LAB
CHOLEST SERPL-MCNC: 230 MG/DL
GLUCOSE SERPL-MCNC: 91 MG/DL (ref 70–99)
HDLC SERPL-MCNC: 70 MG/DL
LDLC SERPL CALC-MCNC: 139 MG/DL
NONHDLC SERPL-MCNC: 160 MG/DL
TRIGL SERPL-MCNC: 107 MG/DL

## 2019-07-03 PROCEDURE — 36415 COLL VENOUS BLD VENIPUNCTURE: CPT | Performed by: FAMILY MEDICINE

## 2019-07-03 PROCEDURE — 80061 LIPID PANEL: CPT | Performed by: FAMILY MEDICINE

## 2019-07-03 PROCEDURE — 82947 ASSAY GLUCOSE BLOOD QUANT: CPT | Performed by: FAMILY MEDICINE

## 2019-07-03 NOTE — RESULT ENCOUNTER NOTE
Tina Ramirez is out of the office and I am reviewing your results.   I see that you have an upcoming appointment with her.   Please review your results with her at your appointment.   Your cholesterol was a bit high and higher than pervious.  Your blood sugar was normal.   Please call or MyChart my office with any questions or concerns.    Cecy Franks, PAC

## 2019-07-08 ASSESSMENT — ENCOUNTER SYMPTOMS
PARESTHESIAS: 0
FEVER: 0
WEAKNESS: 0
NERVOUS/ANXIOUS: 0
SORE THROAT: 0
JOINT SWELLING: 0
EYE PAIN: 0
MYALGIAS: 0
BREAST MASS: 0
ABDOMINAL PAIN: 0
HEARTBURN: 0
DIZZINESS: 0
FREQUENCY: 0
ARTHRALGIAS: 0
CHILLS: 0
PALPITATIONS: 0
DYSURIA: 0
CONSTIPATION: 0
NAUSEA: 0
HEMATOCHEZIA: 0
HEADACHES: 0
DIARRHEA: 0
COUGH: 0
SHORTNESS OF BREATH: 0
HEMATURIA: 0

## 2019-07-08 ASSESSMENT — ACTIVITIES OF DAILY LIVING (ADL): CURRENT_FUNCTION: NO ASSISTANCE NEEDED

## 2019-07-10 ENCOUNTER — OFFICE VISIT (OUTPATIENT)
Dept: FAMILY MEDICINE | Facility: CLINIC | Age: 64
End: 2019-07-10
Payer: COMMERCIAL

## 2019-07-10 VITALS
SYSTOLIC BLOOD PRESSURE: 116 MMHG | HEART RATE: 71 BPM | WEIGHT: 135 LBS | TEMPERATURE: 98.2 F | OXYGEN SATURATION: 98 % | DIASTOLIC BLOOD PRESSURE: 60 MMHG | RESPIRATION RATE: 16 BRPM | HEIGHT: 65 IN | BODY MASS INDEX: 22.49 KG/M2

## 2019-07-10 DIAGNOSIS — M85.80 OSTEOPENIA, UNSPECIFIED LOCATION: ICD-10-CM

## 2019-07-10 DIAGNOSIS — Z23 NEED FOR SHINGLES VACCINE: ICD-10-CM

## 2019-07-10 DIAGNOSIS — Z00.00 ENCOUNTER FOR ROUTINE ADULT HEALTH EXAMINATION WITHOUT ABNORMAL FINDINGS: Primary | ICD-10-CM

## 2019-07-10 PROCEDURE — 99397 PER PM REEVAL EST PAT 65+ YR: CPT | Mod: 25 | Performed by: FAMILY MEDICINE

## 2019-07-10 PROCEDURE — 90471 IMMUNIZATION ADMIN: CPT | Performed by: FAMILY MEDICINE

## 2019-07-10 PROCEDURE — 90750 HZV VACC RECOMBINANT IM: CPT | Performed by: FAMILY MEDICINE

## 2019-07-10 ASSESSMENT — ENCOUNTER SYMPTOMS
MYALGIAS: 0
CONSTIPATION: 0
HEADACHES: 0
PALPITATIONS: 0
NAUSEA: 0
DIARRHEA: 0
ARTHRALGIAS: 0
DIZZINESS: 0
JOINT SWELLING: 0
EYE PAIN: 0
CHILLS: 0
SHORTNESS OF BREATH: 0
WEAKNESS: 0
HEMATOCHEZIA: 0
BREAST MASS: 0
HEMATURIA: 0
FREQUENCY: 0
SORE THROAT: 0
HEARTBURN: 0
DYSURIA: 0
FEVER: 0
PARESTHESIAS: 0
ABDOMINAL PAIN: 0
COUGH: 0
NERVOUS/ANXIOUS: 0

## 2019-07-10 ASSESSMENT — ACTIVITIES OF DAILY LIVING (ADL): CURRENT_FUNCTION: NO ASSISTANCE NEEDED

## 2019-07-10 ASSESSMENT — MIFFLIN-ST. JEOR: SCORE: 1154.27

## 2019-07-10 NOTE — PROGRESS NOTES
"SUBJECTIVE:   Елена Mix is a 63 year old female who presents for Preventive Visit.  Are you in the first 12 months of your Medicare coverage?  No    Healthy Habits:     In general, how would you rate your overall health?  Excellent    Frequency of exercise:  4-5 days/week    Duration of exercise:  15-30 minutes    Do you usually eat at least 4 servings of fruit and vegetables a day, include whole grains    & fiber and avoid regularly eating high fat or \"junk\" foods?  Yes    Taking medications regularly:  Not Applicable    Medication side effects:  Not applicable    Ability to successfully perform activities of daily living:  No assistance needed    Home Safety:  No safety concerns identified    Hearing Impairment:  No hearing concerns    In the past 6 months, have you been bothered by leaking of urine? Yes    In general, how would you rate your overall mental or emotional health?  Good      PHQ-2 Total Score: 0    Additional concerns today:  Yes    Do you feel safe in your environment? Yes    Do you have a Health Care Directive? No: Advance care planning reviewed with patient; information given to patient to review.      Fall risk  Fallen 2 or more times in the past year?: No  Any fall with injury in the past year?: No    Cognitive Screening   1) Repeat 3 items (Leader, Season, Table)    2) Clock draw: NORMAL  3) 3 item recall: Recalls 3 objects  Results: 3 items recalled: COGNITIVE IMPAIRMENT LESS LIKELY    Mini-CogTM Copyright JOSEPH Blue. Licensed by the author for use in [facility]; reprinted with permission (pramod@.[facility]). All rights reserved.      Do you have sleep apnea, excessive snoring or daytime drowsiness?: yes, daytime drowsiness but attributes to work schedule/travel.     Reviewed and updated as needed this visit by clinical staff  Tobacco  Allergies  Meds         Reviewed and updated as needed this visit by Provider    Constipation- using miralax intermittently and working well. "     Retiring this fall. High stress at work right now- high work load.  Will work on advanced directive this year.  Believes had pneumonia vaccine at [facility]  Uses steroid cream for lg rxn to misquito bites        Social History     Tobacco Use     Smoking status: Never Smoker     Smokeless tobacco: Never Used   Substance Use Topics     Alcohol use: Yes     Comment: 1-2 glasses wine in evening, approx 5 x's a week         Alcohol Use 7/8/2019   Prescreen: >3 drinks/day or >7 drinks/week? No   Prescreen: >3 drinks/day or >7 drinks/week? -   AUDIT SCORE  -           Current providers sharing in care for this patient include:   Patient Care Team:  Jana Ramirez MD as PCP - General (Family Practice)  Jana Ramirez MD as Assigned PCP    The following health maintenance items are reviewed in Epic and correct as of today:  Health Maintenance   Topic Date Due     ZOSTER IMMUNIZATION (3 of 3) 06/29/2018     FALL RISK ASSESSMENT  10/03/2018     PNEUMOCOCCAL IMMUNIZATION 65+ LOW/MEDIUM RISK (1 of 2 - PCV13) 10/03/2018     MEDICARE ANNUAL WELLNESS VISIT  04/19/2019     ADVANCE CARE PLANNING  04/21/2019     INFLUENZA VACCINE (1) 09/01/2019     COLONOSCOPY  02/26/2021     MAMMO SCREENING  04/09/2021     HPV  03/03/2022     PAP  03/03/2022     DTAP/TDAP/TD IMMUNIZATION (2 - Td) 06/27/2024     LIPID  07/03/2024     DEXA  Completed     HEPATITIS C SCREENING  Completed     HIV SCREENING  Completed     PHQ-2  Completed     IPV IMMUNIZATION  Aged Out     MENINGITIS IMMUNIZATION  Aged Out       Review of Systems   Constitutional: Negative for chills and fever.   HENT: Negative for congestion, ear pain, hearing loss and sore throat.    Eyes: Negative for pain and visual disturbance.   Respiratory: Negative for cough and shortness of breath.    Cardiovascular: Negative for chest pain, palpitations and peripheral edema.   Gastrointestinal: Negative for abdominal pain, constipation, diarrhea, heartburn,  "hematochezia and nausea.   Breasts:  Negative for tenderness, breast mass and discharge.   Genitourinary: Negative for dysuria, frequency, genital sores, hematuria, pelvic pain, urgency, vaginal bleeding and vaginal discharge.   Musculoskeletal: Negative for arthralgias, joint swelling and myalgias.   Skin: Negative for rash.   Neurological: Negative for dizziness, weakness, headaches and paresthesias.   Psychiatric/Behavioral: Positive for mood changes. The patient is not nervous/anxious.    patient reports the mood changes are only due to the work stressors- denies depression. Retiring this year.     OBJECTIVE:   /60 (BP Location: Right arm, Patient Position: Sitting, Cuff Size: Adult Regular)   Pulse 71   Temp 98.2  F (36.8  C) (Oral)   Resp 16   Ht 1.645 m (5' 4.75\")   Wt 61.2 kg (135 lb)   SpO2 98%   BMI 22.64 kg/m   Estimated body mass index is 22.64 kg/m  as calculated from the following:    Height as of this encounter: 1.645 m (5' 4.75\").    Weight as of this encounter: 61.2 kg (135 lb).  Physical Exam  GENERAL: healthy, alert and no distress  EYES: Eyes grossly normal to inspection, PERRL and conjunctivae and sclerae normal  HENT: ear canals and TM's normal, nose and mouth without ulcers or lesions  NECK: no adenopathy, no asymmetry, masses, or scars and thyroid normal to palpation  RESP: lungs clear to auscultation - no rales, rhonchi or wheezes  BREAST: normal without masses, tenderness or nipple discharge and no palpable axillary masses or adenopathy  CV: regular rate and rhythm, normal S1 S2, no S3 or S4, no murmur, click or rub, no peripheral edema and peripheral pulses strong  ABDOMEN: soft, nontender, no hepatosplenomegaly, no masses and bowel sounds normal   (female): normal female external genitalia, normal urethral meatus, vaginal mucosa pink, moist, well rugated, and normal cervix/adnexa/uterus without masses or discharge  MS: no gross musculoskeletal defects noted, no edema  SKIN: " "no suspicious lesions or rashes  NEURO: Normal strength and tone, mentation intact and speech normal  PSYCH: mentation appears normal, affect normal/bright    Diagnostic Test Results:  Results for orders placed or performed in visit on 07/03/19   **Glucose FUTURE anytime   Result Value Ref Range    Glucose 91 70 - 99 mg/dL   Lipid panel reflex to direct LDL Fasting   Result Value Ref Range    Cholesterol 230 (H) <200 mg/dL    Triglycerides 107 <150 mg/dL    HDL Cholesterol 70 >49 mg/dL    LDL Cholesterol Calculated 139 (H) <100 mg/dL    Non HDL Cholesterol 160 (H) <130 mg/dL         ASSESSMENT / PLAN:   1. Encounter for routine adult health examination without abnormal findings    2. Need for shingles vaccine  - ZOSTER VACCINE RECOMBINANT ADJUVANTED IM NJX  - VACCINE ADMINISTRATION, INITIAL    3. Osteopenia, unspecified location  Monitoring. Ca/vitD/strength training reviewed       End of Life Planning:  Patient currently has an advanced directive: No.  I have verified the patient's ablity to prepare an advanced directive/make health care decisions.  Literature was provided to assist patient in preparing an advanced directive.    COUNSELING:  Reviewed preventive health counseling, as reflected in patient instructions       Regular exercise       Healthy diet/nutrition       Vision screening       Hearing screening       Dental care       Osteoporosis Prevention/Bone Health       Colon cancer screening       Advanced Planning     Estimated body mass index is 22.64 kg/m  as calculated from the following:    Height as of this encounter: 1.645 m (5' 4.75\").    Weight as of this encounter: 61.2 kg (135 lb).    Weight management plan noted, stable and monitoring     reports that she has never smoked. She has never used smokeless tobacco.      Appropriate preventive services were discussed with this patient, including applicable screening as appropriate for cardiovascular disease, diabetes, osteopenia/osteoporosis, and " glaucoma.  As appropriate for age/gender, discussed screening for colorectal cancer, prostate cancer, breast cancer, and cervical cancer. Checklist reviewing preventive services available has been given to the patient.    Reviewed patients plan of care and provided an AVS. The Basic Care Plan (routine screening as documented in Health Maintenance) for Елена meets the Care Plan requirement. This Care Plan has been established and reviewed with the Patient.    Counseling Resources:  ATP IV Guidelines  Pooled Cohorts Equation Calculator  Breast Cancer Risk Calculator  FRAX Risk Assessment  ICSI Preventive Guidelines  Dietary Guidelines for Americans, 2010  USDA's MyPlate  ASA Prophylaxis  Lung CA Screening    Jana Ramirez MD  [facility]    Identified Health Risks:

## 2019-07-10 NOTE — PATIENT INSTRUCTIONS
You are due for two pneumococcal vaccines after age 65:  Pneumovax (PPSV23)  Prevnar (PCV13)  Schedule nurse only visit if either of these is still needed. Typically separate the two vaccines by one year.         At [facility], we strive to deliver an exceptional experience to you, every time we see you.  If you receive a survey in the mail, please send us back your thoughts. We really do value your feedback.    Your care team:     Family Medicine   SHERLY Linares MD Emily Bunt, APRN CNP S. MD Tali Siegel MD Angela Wermerskirchen, MD         Clinic hours: Monday - Wednesday 7 am-7 pm   Thursdays and Fridays 7 am-5 pm.     [facility]: Monday - Friday 11 am-9 pm,   Saturday and Sunday 9 am-5 pm.    [facility]: Monday -Thursday 8 am-8 pm; Friday 8 am-6 pm; Saturday and Sunday 9 am-5 pm.     [facility]: Monday - Thursday 8 am - 7 pm; Friday 8 am - 6 pm    Clinic: [phone]   [facility]: [phone]   [facility]: [phone]      The 10-year ASCVD risk score (Santo SHIN Jr., et al., 2013) is: 4.5%    Values used to calculate the score:      Age: 63 years      Sex: Female      Is Non- : No      Diabetic: No      Tobacco smoker: No      Systolic Blood Pressure: 116 mmHg      Is BP treated: No      HDL Cholesterol: 70 mg/dL      Total Cholesterol: 230 mg/dL

## 2019-07-10 NOTE — NURSING NOTE
Screening Questionnaire for Adult Immunization    Are you sick today?   No   Do you have allergies to medications, food, a vaccine component or latex?   No   Have you ever had a serious reaction after receiving a vaccination?   No   Do you have a long-term health problem with heart disease, lung disease, asthma, kidney disease, metabolic disease (e.g. diabetes), anemia, or other blood disorder?   No   Do you have cancer, leukemia, HIV/AIDS, or any other immune system problem?   No   In the past 3 months, have you taken medications that affect  your immune system, such as prednisone, other steroids, or anticancer drugs; drugs for the treatment of rheumatoid arthritis, Crohn s disease, or psoriasis; or have you had radiation treatments?   No   Have you had a seizure, or a brain or other nervous system problem?   No   During the past year, have you received a transfusion of blood or blood     products, or been given immune (gamma) globulin or antiviral drug?   No   For women: Are you pregnant or is there a chance you could become        pregnant during the next month?   No   Have you received any vaccinations in the past 4 weeks?   No     Immunization questionnaire answers were all negative.        Patient instructed to remain in clinic for 15 minutes afterwards, and to report any adverse reaction to me immediately.       Screening performed by Camille Evans on 7/10/2019 at 4:02 PM.

## 2019-07-15 ENCOUNTER — OFFICE VISIT (OUTPATIENT)
Dept: OPHTHALMOLOGY | Facility: CLINIC | Age: 64
End: 2019-07-15
Payer: COMMERCIAL

## 2019-07-15 DIAGNOSIS — H04.123 DRY EYES: ICD-10-CM

## 2019-07-15 DIAGNOSIS — H26.9 NUCLEAR CATARACT, NONSENILE: Primary | ICD-10-CM

## 2019-07-15 PROCEDURE — 92014 COMPRE OPH EXAM EST PT 1/>: CPT | Performed by: OPHTHALMOLOGY

## 2019-07-15 ASSESSMENT — SLIT LAMP EXAM - LIDS
COMMENTS: NORMAL
COMMENTS: NORMAL

## 2019-07-15 ASSESSMENT — REFRACTION_MANIFEST
OD_SPHERE: -0.50
OD_ADD: +2.50
OS_ADD: +2.50
OD_CYLINDER: +1.25
OD_AXIS: 165
OS_CYLINDER: +0.50
OS_AXIS: 180
OS_SPHERE: -0.25

## 2019-07-15 ASSESSMENT — VISUAL ACUITY
OS_CC: 20/20
OD_CC: 20/25
METHOD: SNELLEN - LINEAR
OS_CC: 20/25
CORRECTION_TYPE: GLASSES
OD_CC: 20/20
OS_CC+: -2
OD_CC+: -2

## 2019-07-15 ASSESSMENT — CONF VISUAL FIELD
OD_NORMAL: 1
OS_NORMAL: 1

## 2019-07-15 ASSESSMENT — TONOMETRY
IOP_METHOD: TONOPEN
OD_IOP_MMHG: 19
OS_IOP_MMHG: 16

## 2019-07-15 ASSESSMENT — REFRACTION_WEARINGRX
OD_SPHERE: -0.50
OD_AXIS: 160
SPECS_TYPE: PAL
OS_SPHERE: -0.25
OD_ADD: +2.50
OS_AXIS: 180
OS_ADD: +2.50
OD_CYLINDER: +1.25
OS_CYLINDER: +0.25

## 2019-07-15 ASSESSMENT — CUP TO DISC RATIO
OD_RATIO: 0.35
OS_RATIO: 0.35

## 2019-07-15 ASSESSMENT — EXTERNAL EXAM - RIGHT EYE: OD_EXAM: NORMAL

## 2019-07-15 ASSESSMENT — EXTERNAL EXAM - LEFT EYE: OS_EXAM: NORMAL

## 2019-07-15 NOTE — PROGRESS NOTES
Assessment & Plan   Елена Mix is a 63 year old female who presents with:   Review of systems for the eyes was negative other than the pertinent positives and negatives noted in the HPI.    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  I personally obtained the chief complaint(s) and history of present illness.  I confirmed and edited as necessary the review of systems, past medical/surgical history, family history, social history, and examination findings as documented by others; and I examined the patient myself.  I personally reviewed the relevant tests, images, and reports as documented above.  I formulated and edited as necessary the assessment and plan and discussed the findings and management plan with the patient and family. - Marv Bolaños MD

## 2019-07-15 NOTE — NURSING NOTE
Patient presents with:  Annual Eye Exam: VA changes left eye >right eye.  Cataract Follow-Up      Referring Provider:  No referring provider defined for this encounter.        Yarelis Sinha COT

## 2019-07-17 ENCOUNTER — DOCUMENTATION ONLY (OUTPATIENT)
Dept: LAB | Facility: CLINIC | Age: 64
End: 2019-07-17

## 2019-07-17 NOTE — PROGRESS NOTES
PT HAS A LAB APPOINTMENT OF 7/22/2019 PLEASE REVIEW CHART AND ADD ORDERS IF NECESSARY.     THANK YOU,     [facility]

## 2019-07-22 ENCOUNTER — APPOINTMENT (OUTPATIENT)
Dept: LAB | Facility: CLINIC | Age: 64
End: 2019-07-22
Payer: COMMERCIAL

## 2019-07-22 ENCOUNTER — ALLIED HEALTH/NURSE VISIT (OUTPATIENT)
Dept: NURSING | Facility: CLINIC | Age: 64
End: 2019-07-22
Payer: COMMERCIAL

## 2019-07-22 DIAGNOSIS — Z23 NEED FOR PROPHYLACTIC VACCINATION AGAINST STREPTOCOCCUS PNEUMONIAE (PNEUMOCOCCUS): Primary | ICD-10-CM

## 2019-07-22 PROCEDURE — 90670 PCV13 VACCINE IM: CPT

## 2019-07-22 PROCEDURE — 99207 ZZC NO CHARGE NURSE ONLY: CPT

## 2019-07-22 PROCEDURE — 90471 IMMUNIZATION ADMIN: CPT

## 2019-07-22 NOTE — NURSING NOTE
Screening Questionnaire for Adult Immunization    Are you sick today?   No   Do you have allergies to medications, food, a vaccine component or latex?   No   Have you ever had a serious reaction after receiving a vaccination?   No   Do you have a long-term health problem with heart disease, lung disease, asthma, kidney disease, metabolic disease (e.g. diabetes), anemia, or other blood disorder?   No   Do you have cancer, leukemia, HIV/AIDS, or any other immune system problem?   No   In the past 3 months, have you taken medications that affect  your immune system, such as prednisone, other steroids, or anticancer drugs; drugs for the treatment of rheumatoid arthritis, Crohn s disease, or psoriasis; or have you had radiation treatments?   No   Have you had a seizure, or a brain or other nervous system problem?   No   During the past year, have you received a transfusion of blood or blood     products, or been given immune (gamma) globulin or antiviral drug?   No   For women: Are you pregnant or is there a chance you could become        pregnant during the next month?   No   Have you received any vaccinations in the past 4 weeks?   No     Immunization questionnaire answers were all negative.        Patient instructed to remain in clinic for 15 minutes afterwards, and to report any adverse reaction to me immediately.       Screening performed by Camille Evans on 7/22/2019 at 5:22 PM.

## 2019-09-28 ENCOUNTER — HEALTH MAINTENANCE LETTER (OUTPATIENT)
Age: 64
End: 2019-09-28

## 2019-10-01 ENCOUNTER — ALLIED HEALTH/NURSE VISIT (OUTPATIENT)
Dept: NURSING | Facility: CLINIC | Age: 64
End: 2019-10-01
Payer: COMMERCIAL

## 2019-10-01 DIAGNOSIS — Z23 NEED FOR PROPHYLACTIC VACCINATION AND INOCULATION AGAINST INFLUENZA: Primary | ICD-10-CM

## 2019-10-01 PROCEDURE — G0008 ADMIN INFLUENZA VIRUS VAC: HCPCS

## 2019-10-01 PROCEDURE — 90662 IIV NO PRSV INCREASED AG IM: CPT

## 2019-10-01 PROCEDURE — 99207 ZZC NO CHARGE NURSE ONLY: CPT

## 2019-12-07 ENCOUNTER — VIRTUAL VISIT (OUTPATIENT)
Dept: FAMILY MEDICINE | Facility: OTHER | Age: 64
End: 2019-12-07

## 2019-12-07 NOTE — PROGRESS NOTES
"Date: 2019 07:02:16  Clinician: Jeannie Oshea  Clinician NPI: [id number]  Patient: Елена Mix  Patient : 1955  Patient Address: [address] [place] [address] [place] [address]  Patient Phone: [phone]  Visit Protocol: UTI  Patient Summary:  Елена is a 64 year old ( : 1955 ) female who initiated a Visit for a presumed bladder infection. When asked the question \"Please sign me up to receive news, health information and promotions. \", Елена responded \"Yes\".   Her symptoms started 1-3 days ago and consist of dysuria, urgency, foul-smelling urine, feeling as if the bladder is never empty, and urinary frequency.   Symptom details   Urine color: Her urine does not have any color.    Denied symptoms include vaginal discharge, urinary incontinence, vomiting, vaginal itching, nausea, flank pain, abdominal pain, and chills. She does not feel feverish.   Елена has not used any over-the-counter medications or home remedies to relieve her current symptoms.  Precipitating events  Елена denies having a sexually transmitted disease.  Pertinent medical history  Елена has had a bladder infection before but has not had any in the past 12 months. Her current symptoms are similar to her previous bladder infection symptoms.   She is not sure what antibiotics have been effective in treating her past bladder infections.   Елена has not been prescribed antibiotics to prevent frequent or repeated bladder infections in the past and does not get yeast infections when she takes antibiotics. She has not experienced problems or side effects with any of the common antibiotics used to treat bladder infections.   Елена does not have a history of kidney stones. She has not used a catheter or been a patient in a hospital or nursing home in the past 2 weeks.   Елена does not smoke or use smokeless tobacco.   Additional information as reported by the patient (free text): I'm on the tail end of " a 9 day virus; stuffy nose, bronchial cough.  I took Sudafed and Mucinex to treat symptoms.  No fever now but woke up yesterday with the full bladder feeling, pain, etc.     MEDICATIONS: Sudafed oral, ALLERGIES: NKDA  Clinician Response:  Dear Елена,  Based on the information you have provided, you likely have an acute urinary tract infection, also called a bladder infection. Bladder infections occur when bacteria from the outside of the body enters the urinary tract. Any part of the urinary system can be infected, but the bladder is the most common.  Medication information  I am prescribing:     Cephalexin (Keflex) 500 mg oral capsule. Take 1 capsule by mouth every 12 hours for 7 days. There are no refills with this prescription.   The medication I prescribed for your bladder infection is an antibiotic. Continue taking the medication until it is gone even if you feel better. If you get an upset stomach while taking antibiotics, taking the medication with food can help.   Yeast infections can be a common side effect of antibiotics. The most common symptom of a yeast infection is itchiness in and around the vagina. Other signs and symptoms include burning, redness, or a thick, white vaginal discharge that looks like cottage cheese and does not have a bad smell.  Self care  Urination helps to flush bacteria from the urinary tract. For this reason, drinking water and urinating often helps relieve some urinary symptoms and can decrease your risk of getting bladder infections in the future.  Other steps you can take to prevent future bladder infections include:     Wipe front to back after using the bathroom    Urinate after sexual intercourse    Avoid using deodorant sprays, douches, or powders in the vaginal area     When to seek care  Please make an appointment to be seen in a clinic or urgent care if any of the following occur:     You develop new symptoms or your symptoms become worse    You have medication side  effects that make it difficult to take them as prescribed    Your symptoms do not improve within 1-2 days of starting treatment    You have symptoms of a bladder infection that return shortly after completing treatment     It is possible to have an allergic reaction to an antibiotic even if you have not had one in the past. If you notice a new rash, significant swelling, or difficulty breathing, stop taking this medication immediately and go to a clinic or urgent care.   Diagnosis: Acute uncomplicated bladder infection  Diagnosis ICD: N39.0  Prescription: cephalexin (Keflex) 500 mg oral capsule 14 capsule, 7 days supply. Take 1 capsule by mouth every 12 hours for 7 days. Refills: 0, Refill as needed: no, Allow substitutions: yes  Pharmacy: [id number] - [phone] - [address] [place] [address]

## 2019-12-19 ENCOUNTER — MYC MEDICAL ADVICE (OUTPATIENT)
Dept: FAMILY MEDICINE | Facility: CLINIC | Age: 64
End: 2019-12-19

## 2020-01-22 ENCOUNTER — OFFICE VISIT (OUTPATIENT)
Dept: FAMILY MEDICINE | Facility: CLINIC | Age: 65
End: 2020-01-22
Payer: COMMERCIAL

## 2020-01-22 VITALS
RESPIRATION RATE: 16 BRPM | TEMPERATURE: 97.9 F | WEIGHT: 132 LBS | OXYGEN SATURATION: 98 % | HEIGHT: 65 IN | SYSTOLIC BLOOD PRESSURE: 136 MMHG | DIASTOLIC BLOOD PRESSURE: 76 MMHG | HEART RATE: 80 BPM | BODY MASS INDEX: 21.99 KG/M2

## 2020-01-22 DIAGNOSIS — Z20.828 EXPOSURE TO INFLUENZA: Primary | ICD-10-CM

## 2020-01-22 DIAGNOSIS — J10.1 INFLUENZA B: ICD-10-CM

## 2020-01-22 LAB
FLUAV+FLUBV AG SPEC QL: NEGATIVE
FLUAV+FLUBV AG SPEC QL: POSITIVE
SPECIMEN SOURCE: ABNORMAL

## 2020-01-22 PROCEDURE — 99213 OFFICE O/P EST LOW 20 MIN: CPT | Performed by: FAMILY MEDICINE

## 2020-01-22 PROCEDURE — 87804 INFLUENZA ASSAY W/OPTIC: CPT | Performed by: FAMILY MEDICINE

## 2020-01-22 RX ORDER — OSELTAMIVIR PHOSPHATE 75 MG/1
75 CAPSULE ORAL 2 TIMES DAILY
Qty: 10 CAPSULE | Refills: 0 | Status: SHIPPED | OUTPATIENT
Start: 2020-01-22 | End: 2020-01-27

## 2020-01-22 ASSESSMENT — MIFFLIN-ST. JEOR: SCORE: 1135.66

## 2020-01-22 ASSESSMENT — PAIN SCALES - GENERAL: PAINLEVEL: NO PAIN (0)

## 2020-01-22 NOTE — PROGRESS NOTES
Subjective     Елена Mix is a 64 year old female who presents to clinic today for the following health issues:    HPI     Acute Illness   Acute illness concerns: sinus sx's, sleeping poorly  Onset: 1 week - 1/17/20 got worse    Fever: no    Chills/Sweats: YES- chills in last couple of days.    Headache (location?): YES    Sinus Pressure:YES    Conjunctivitis:  no    Ear Pain: YES: bilateral    Rhinorrhea: YES    Congestion: YES    Sore Throat: no     Cough: YES - dry cough, recently started    Wheeze: no    Decreased Appetite: no    Nausea: no    Vomiting: no    Diarrhea:  YES    Dysuria/Freq.: no    Fatigue/Achiness: YES    Sick/Strep Exposure: YES- grand son (influenza B)     Therapies Tried and outcome: Sudafed, Zicam, Nasal Spray, steam (in shower)    Patient has had exposure of influenza from her grandson on 01/14/2020. She reports that the left ear feels it has more fluid than the right, and is achy. She denies having any back pain or stomach issues. Patient reports that in November she had a 10 day virus but fully recovered prior to this onset.     BP Readings from Last 3 Encounters:   01/22/20 136/76   07/10/19 116/60   04/19/18 120/70    Wt Readings from Last 3 Encounters:   01/22/20 59.9 kg (132 lb)   07/10/19 61.2 kg (135 lb)   04/19/18 59.4 kg (131 lb)         Reviewed and updated as needed this visit by Provider  Tobacco  Allergies  Meds  Med Hx  Surg Hx  Fam Hx  Soc Hx        Review of Systems   ROS COMP: Constitutional, HEENT, cardiovascular, pulmonary, GI, , musculoskeletal, neuro, skin, endocrine and psych systems are negative, except as otherwise noted.      This document serves as a record of the services and decisions personally performed and made by Tali Hamilton MD. It was created on his behalf by Sean Hernandez, a trained medical scribe. The creation of this document is based on the provider's statements to the medical scribe.  Sean Hernandez 8:22 AM January 22,  "2020      Objective    /76 (BP Location: Right arm, Patient Position: Sitting, Cuff Size: Adult Regular)   Pulse 80   Temp 97.9  F (36.6  C) (Oral)   Resp 16   Ht 1.645 m (5' 4.75\")   Wt 59.9 kg (132 lb)   SpO2 98%   BMI 22.14 kg/m    Body mass index is 22.14 kg/m .  Physical Exam   GENERAL: healthy, alert and no distress  RESP: lungs clear to auscultation - no rales, rhonchi or wheezes  CV: regular rate and rhythm, normal S1 S2, no S3 or S4, no murmur, click or rub, no peripheral edema and peripheral pulses strong  SKIN: no suspicious lesions or rashes  NEURO: Normal strength and tone, mentation intact and speech normal  PSYCH: mentation appears normal, affect normal/bright    Diagnostic Test Results:  Labs reviewed in Epic  Results for orders placed or performed in visit on 01/22/20 (from the past 24 hour(s))   Influenza A/B antigen   Result Value Ref Range    Influenza A/B Agn Specimen Nasal     Influenza A Negative NEG^Negative    Influenza B Positive (A) NEG^Negative             Assessment & Plan     1. Exposure to influenza  Labs today, positive for influenza    - Influenza A/B antigen    2. Influenza B  Initial exposure 8 days ago.  Ongoing exposure to family member.  Her symptoms are worsening in the last 48 hours.  Patient will begin taking Tamiflu to help with influenza virus.  Continue symptomatic care.  - oseltamivir (TAMIFLU) 75 MG capsule; Take 1 capsule (75 mg) by mouth 2 times daily for 5 days  Dispense: 10 capsule; Refill: 0       Patient Instructions   Begin Tamiflu.    Symptomatic care with tylenol, ibuprofen, fluids, rest.      Return in about 2 weeks (around 2/5/2020) for as needed for persistent symptoms.    The information in this document, created by the medical scribe for me, accurately reflects the services I personally performed and the decisions made by me. I have reviewed and approved this document for accuracy prior to leaving the patient care area.  January 22, 2020 8:49 " AM    Tali Hamilton MD  [facility] to PCP

## 2020-01-22 NOTE — PROGRESS NOTES
"Subjective     Елена Mix is a 64 year old female who presents to clinic today for the following health issues:    HPI     Acute Illness   Acute illness concerns: sinus sx's, sleeping poorly  Onset: 1 week - 1/17/20 got worse    Fever: no    Chills/Sweats: YES- chills    Headache (location?): YES    Sinus Pressure:YES    Conjunctivitis:  no    Ear Pain: YES: bilateral    Rhinorrhea: YES    Congestion: YES    Sore Throat: no     Cough: YES - dry cough, recently started    Wheeze: no    Decreased Appetite: no    Nausea: no    Vomiting: no    Diarrhea:  YES    Dysuria/Freq.: no    Fatigue/Achiness: YES    Sick/Strep Exposure: YES- grand son (influenza B)     Therapies Tried and outcome: Sudafed, Zicam, Nasal Spray, steam (in shower)    {additonal problems for provider to add (Optional):901093}    {HIST REVIEW/ LINKS 2 (Optional):131698}    {Additional problems for the provider to add (optional):510081}  Reviewed and updated as needed this visit by Provider         Review of Systems   {ROS COMP (Optional):538609}      Objective    /76 (BP Location: Right arm, Patient Position: Sitting, Cuff Size: Adult Regular)   Pulse 80   Temp 97.9  F (36.6  C) (Oral)   Resp 16   Ht 1.645 m (5' 4.75\")   Wt 59.9 kg (132 lb)   SpO2 98%   BMI 22.14 kg/m    Body mass index is 22.14 kg/m .  Physical Exam   {Exam List (Optional):228457}    {Diagnostic Test Results (Optional):287760::\"Diagnostic Test Results:\",\"Labs reviewed in Epic\"}        {PROVIDER CHARTING PREFERENCE:910544}        "

## 2020-07-15 ENCOUNTER — TRANSFERRED RECORDS (OUTPATIENT)
Dept: HEALTH INFORMATION MANAGEMENT | Facility: CLINIC | Age: 65
End: 2020-07-15

## 2020-07-28 ENCOUNTER — OFFICE VISIT (OUTPATIENT)
Dept: OPHTHALMOLOGY | Facility: CLINIC | Age: 65
End: 2020-07-28
Payer: COMMERCIAL

## 2020-07-28 DIAGNOSIS — H26.9 NUCLEAR CATARACT, NONSENILE: Primary | ICD-10-CM

## 2020-07-28 DIAGNOSIS — H04.123 DRY EYES: ICD-10-CM

## 2020-07-28 PROCEDURE — 92014 COMPRE OPH EXAM EST PT 1/>: CPT | Performed by: OPHTHALMOLOGY

## 2020-07-28 RX ORDER — FEXOFENADINE HCL 180 MG/1
180 TABLET ORAL DAILY
COMMUNITY

## 2020-07-28 ASSESSMENT — CONF VISUAL FIELD
OD_NORMAL: 1
METHOD: COUNTING FINGERS
OS_NORMAL: 1

## 2020-07-28 ASSESSMENT — EXTERNAL EXAM - RIGHT EYE: OD_EXAM: NORMAL

## 2020-07-28 ASSESSMENT — SLIT LAMP EXAM - LIDS
COMMENTS: NORMAL
COMMENTS: NORMAL

## 2020-07-28 ASSESSMENT — CUP TO DISC RATIO
OD_RATIO: 0.35
OS_RATIO: 0.35

## 2020-07-28 ASSESSMENT — VISUAL ACUITY
OD_SC: 20/20
OD_SC+: -1
METHOD: SNELLEN - LINEAR
OS_SC+: -1
OS_SC: 20/20

## 2020-07-28 ASSESSMENT — TONOMETRY
OS_IOP_MMHG: 18
IOP_METHOD: ICARE
OD_IOP_MMHG: 15

## 2020-07-28 ASSESSMENT — EXTERNAL EXAM - LEFT EYE: OS_EXAM: NORMAL

## 2020-07-28 NOTE — PROGRESS NOTES
Assessment & Plan   Елена Mix is a 64 year old female who presents with: She retired last September and thinks her eyes feel much better without so much computer strain. Note moderate glase with night driving.  Review of systems for the eyes was negative other than the pertinent positives and negatives noted in the HPI.     Nuclear cataract, nonsenile  - Good vision  - Observe     Dry eyes  - Start AT's qid OU     Astigmatism/Presbyopia  - Continiue OTC readers      Return in 12 months for annual        Attending Physician Attestation:  Complete documentation of historical and exam elements from today's encounter can be found in the full encounter summary report (not reduplicated in this progress note).  I personally obtained the chief complaint(s) and history of present illness.  I confirmed and edited as necessary the review of systems, past medical/surgical history, family history, social history, and examination findings as documented by others; and I examined the patient myself.  I personally reviewed the relevant tests, images, and reports as documented above.  I formulated and edited as necessary the assessment and plan and discussed the findings and management plan with the patient and family. - Marv Bolaños MD

## 2020-07-28 NOTE — NURSING NOTE
Chief Complaints and History of Present Illnesses   Patient presents with     Annual Eye Exam       Chief Complaint(s) and History of Present Illness(es)     Annual Eye Exam               Comments     Patient is aware there is a little cloudiness from cataracts, left > right. Otherwise vision hasn't really changed, wears OTC readers only.    Retired last fall so no longer staring at a computer so eyes aren't as dry.     Ocular meds: artificial tears on occasion                Melanie Jeans, OA

## 2020-08-31 ENCOUNTER — OFFICE VISIT (OUTPATIENT)
Dept: FAMILY MEDICINE | Facility: CLINIC | Age: 65
End: 2020-08-31
Payer: COMMERCIAL

## 2020-08-31 VITALS
BODY MASS INDEX: 21.33 KG/M2 | RESPIRATION RATE: 18 BRPM | WEIGHT: 128 LBS | SYSTOLIC BLOOD PRESSURE: 116 MMHG | TEMPERATURE: 98 F | DIASTOLIC BLOOD PRESSURE: 78 MMHG | HEART RATE: 70 BPM | OXYGEN SATURATION: 100 % | HEIGHT: 65 IN

## 2020-08-31 DIAGNOSIS — Z13.1 SCREENING FOR DIABETES MELLITUS: ICD-10-CM

## 2020-08-31 DIAGNOSIS — Z13.29 SCREENING FOR THYROID DISORDER: ICD-10-CM

## 2020-08-31 DIAGNOSIS — Z23 INFLUENZA VACCINE NEEDED: ICD-10-CM

## 2020-08-31 DIAGNOSIS — Z13.220 LIPID SCREENING: ICD-10-CM

## 2020-08-31 DIAGNOSIS — Z00.00 ENCOUNTER FOR MEDICARE ANNUAL WELLNESS EXAM: Primary | ICD-10-CM

## 2020-08-31 DIAGNOSIS — Z13.0 SCREENING, ANEMIA, DEFICIENCY, IRON: ICD-10-CM

## 2020-08-31 DIAGNOSIS — Z23 NEED FOR 23-POLYVALENT PNEUMOCOCCAL POLYSACCHARIDE VACCINE: ICD-10-CM

## 2020-08-31 PROCEDURE — G0008 ADMIN INFLUENZA VIRUS VAC: HCPCS | Performed by: FAMILY MEDICINE

## 2020-08-31 PROCEDURE — 90662 IIV NO PRSV INCREASED AG IM: CPT | Performed by: FAMILY MEDICINE

## 2020-08-31 PROCEDURE — G0009 ADMIN PNEUMOCOCCAL VACCINE: HCPCS | Performed by: FAMILY MEDICINE

## 2020-08-31 PROCEDURE — 90732 PPSV23 VACC 2 YRS+ SUBQ/IM: CPT | Performed by: FAMILY MEDICINE

## 2020-08-31 PROCEDURE — G0439 PPPS, SUBSEQ VISIT: HCPCS | Performed by: FAMILY MEDICINE

## 2020-08-31 ASSESSMENT — PAIN SCALES - GENERAL: PAINLEVEL: NO PAIN (0)

## 2020-08-31 ASSESSMENT — MIFFLIN-ST. JEOR: SCORE: 1113.54

## 2020-08-31 NOTE — PROGRESS NOTES
Prior to immunization administration, verified patients identity using patient s name and date of birth. Please see Immunization Activity for additional information.     Screening Questionnaire for Adult Immunization    Are you sick today?   No   Do you have allergies to medications, food, a vaccine component or latex?   No   Have you ever had a serious reaction after receiving a vaccination?   No   Do you have a long-term health problem with heart, lung, kidney, or metabolic disease (e.g., diabetes), asthma, a blood disorder, no spleen, complement component deficiency, a cochlear implant, or a spinal fluid leak?  Are you on long-term aspirin therapy?   No   Do you have cancer, leukemia, HIV/AIDS, or any other immune system problem?   No   Do you have a parent, brother, or sister with an immune system problem?   No   In the past 3 months, have you taken medications that affect  your immune system, such as prednisone, other steroids, or anticancer drugs; drugs for the treatment of rheumatoid arthritis, Crohn s disease, or psoriasis; or have you had radiation treatments?   No   Have you had a seizure, or a brain or other nervous system problem?   No   During the past year, have you received a transfusion of blood or blood    products, or been given immune (gamma) globulin or antiviral drug?   No   For women: Are you pregnant or is there a chance you could become       pregnant during the next month?   No   Have you received any vaccinations in the past 4 weeks?   No     Immunization questionnaire answers were all negative.        Per orders of Dr. Hamilton, injection of Pnuemococcal, Influenza given by Lorena Ram MA. Patient instructed to remain in clinic for 15 minutes afterwards, and to report any adverse reaction to me immediately.       Screening performed by Lorena Ram MA on 8/31/2020 at 2:45 PM.

## 2020-08-31 NOTE — PATIENT INSTRUCTIONS
Return to clinic for fasting labs - schedule lab appointment.    Flu vaccine and Pneumonia vaccine today.    Follow up with Dermatology as planned.          At [facility], we strive to deliver an exceptional experience to you, every time we see you. If you receive a survey, please complete it as we do value your feedback.  If you have MyChart, you can expect to receive results automatically within 24 hours of their completion.  Your provider will send a note interpreting your results as well.   If you do not have MyChart, you should receive your results in about a week by mail.    Your care team:                            Family Medicine Internal Medicine   MD Gregorio Benton, MD Tari Mcclain, MD Mehrdad Simons, MD Mila Licona, APRN CNP    Cruz Oh, MD Pediatrics   Brian Hyman, PASERAFIN Coelho, CNP MD Марина Isidro APRN CNP   Devi Peters, MD Pascale Cobos, MD Naz To, APRN CNP  Cecy Odell, PASERAFIN Daniels, CNP  MD Tali Siegel MD Angela Wermerskirchen, MD      Clinic hours: Monday - Thursday 7 am-7 pm; Fridays 7 am-5 pm.   Urgent care: Monday - Friday 11 am-9 pm; Saturday and Sunday 9 am-5 pm.    Clinic: [phone]       [facility]: Monday - Thursday 8 am - 7 pm; Friday 8 am - 6 pm  [facility]: [phone]     Use [email or web address] for 24/7 diagnosis and treatment of dozens of conditions.      Patient Education   Personalized Prevention Plan  You are due for the preventive services outlined below.  Your care team is available to assist you in scheduling these services.  If you have already completed any of these items, please share that information with your care team to update in your medical record.  Health Maintenance Due   Topic Date Due     FALL RISK ASSESSMENT  07/10/2020      Pneumococcal Vaccine (2 of 2 - PPSV23) 07/22/2020     Flu Vaccine (1) 09/01/2020

## 2020-08-31 NOTE — PROGRESS NOTES
"  SUBJECTIVE:   Елена Mix is a 64 year old female who presents for Preventive Visit.  Are you in the first 12 months of your Medicare Part B coverage?  Yes,  Visual Acuity:  Right Eye: 20/20   Left Eye: 20/20  Both Eyes: 20/30    Physical Health:    In general, how would you rate your overall physical health? excellent    Outside of work, how many days during the week do you exercise? 4-5 days/week  Walking, bands/weights, lawn care    Outside of work, approximately how many minutes a day do you exercise?45-60 minutes    If you drink alcohol do you typically have >3 drinks per day or >7 drinks per week? No    Do you usually eat at least 4 servings of fruit and vegetables a day, include whole grains & fiber and avoid regularly eating high fat or \"junk\" foods? Yes    Do you have any problems taking medications regularly?  No    Do you have any side effects from medications? none    Needs assistance for the following daily activities: no assistance needed    Which of the following safety concerns are present in your home?  none identified     Hearing impairment: None other than ringing in ears    In the past 6 months, have you been bothered by leaking of urine? no    Mental Health:    In general, how would you rate your overall mental or emotional health? excellent  PHQ-2 Score:  0    Do you feel safe in your environment? Yes    Have you ever done Advance Care Planning? (For example, a Health Directive, POLST, or a discussion with a medical provider or your loved ones about your wishes): No, advance care planning information given to patient to review.  Patient plans to discuss their wishes with loved ones or provider.      Additional concerns to address?  YES  - 2 years ago to skin MD - 4 skin lesions -precancerous lesions.    Fall risk:  Fallen 2 or more times in the past year?: No  Any fall with injury in the past year?: No    Cognitive Screenin) Repeat 3 items (Leader, Season, Table)    2) Clock " draw: NORMAL  3) 3 item recall: Recalls 3 objects  Results: 3 items recalled: COGNITIVE IMPAIRMENT LESS LIKELY    Mini-CogTM Copyright S Su. Licensed by the author for use in Health system; reprinted with permission (pramod@[facility]). All rights reserved.            Reviewed and updated as needed this visit by clinical staff  Tobacco  Allergies  Meds  Med Hx  Surg Hx  Fam Hx  Soc Hx        Reviewed and updated as needed this visit by Provider  Tobacco  Allergies  Meds  Med Hx  Surg Hx  Fam Hx  Soc Hx       Social History     Tobacco Use     Smoking status: Never Smoker     Smokeless tobacco: Never Used   Substance Use Topics     Alcohol use: Yes     Comment: 1 glasses wine in evening, approx 4-5 x's a week                           Current providers sharing in care for this patient include:   Patient Care Team:  Jana Ramirez MD as PCP - General (Family Practice)  Tali Hamilton MD as Assigned PCP    The following health maintenance items are reviewed in Epic and correct as of today:  Health Maintenance   Topic Date Due     COLORECTAL CANCER SCREENING  02/26/2021     MEDICARE ANNUAL WELLNESS VISIT  08/31/2021     FALL RISK ASSESSMENT  08/31/2021     MAMMO SCREENING  07/15/2022     DTAP/TDAP/TD IMMUNIZATION (2 - Td) 06/27/2024     LIPID  07/03/2024     ADVANCE CARE PLANNING  08/31/2025     DEXA  Completed     HEPATITIS C SCREENING  Completed     PHQ-2  Completed     INFLUENZA VACCINE  Completed     PNEUMOCOCCAL IMMUNIZATION 65+ LOW/MEDIUM RISK  Completed     ZOSTER IMMUNIZATION  Completed     IPV IMMUNIZATION  Aged Out     MENINGITIS IMMUNIZATION  Aged Out     HEPATITIS B IMMUNIZATION  Aged Out     BP Readings from Last 3 Encounters:   08/31/20 116/78   01/22/20 136/76   07/10/19 116/60    Wt Readings from Last 3 Encounters:   08/31/20 58.1 kg (128 lb)   01/22/20 59.9 kg (132 lb)   07/10/19 61.2 kg (135 lb)                  Pneumonia Vaccine: Completing series today  Mammogram  "Screening: Mammogram Screening: Patient over age 50, mutual decision to screen reflected in health maintenance.    ROS:  10 point ROS of systems including Constitutional, Eyes, Respiratory, Cardiovascular, Gastroenterology, Genitourinary, Integumentary, Muscularskeletal, Psychiatric were all negative except for pertinent positives noted in my HPI.      OBJECTIVE:   /78 (BP Location: Right arm, Patient Position: Chair, Cuff Size: Adult Regular)   Pulse 70   Temp 98  F (36.7  C) (Oral)   Resp 18   Ht 1.638 m (5' 4.5\")   Wt 58.1 kg (128 lb)   SpO2 100%   BMI 21.63 kg/m   Estimated body mass index is 21.63 kg/m  as calculated from the following:    Height as of this encounter: 1.638 m (5' 4.5\").    Weight as of this encounter: 58.1 kg (128 lb).  EXAM:   GENERAL APPEARANCE: healthy, alert and no distress  EYES: Eyes grossly normal to inspection, PERRL and conjunctivae and sclerae normal  HENT: ear canals and TM's normal, nose and mouth without ulcers or lesions, oropharynx clear and oral mucous membranes moist  NECK: no adenopathy, no asymmetry, masses, or scars and thyroid normal to palpation  RESP: lungs clear to auscultation - no rales, rhonchi or wheezes  BREAST: normal without masses, tenderness or nipple discharge and no palpable axillary masses or adenopathy  CV: regular rate and rhythm, normal S1 S2, no S3 or S4, no murmur, click or rub, no peripheral edema and peripheral pulses strong  ABDOMEN: soft, nontender, no hepatosplenomegaly, no masses and bowel sounds normal   (female): normal female external genitalia, normal urethral meatus, vaginal mucosal atrophy noted, normal cervix, adnexae, and uterus without masses or abnormal discharge  MS: no musculoskeletal defects are noted and gait is age appropriate without ataxia  SKIN: no suspicious lesions or rashes  NEURO: Normal strength and tone, sensory exam grossly normal, mentation intact and speech normal  PSYCH: mentation appears normal and affect " "normal/bright    Diagnostic Test Results:  Labs reviewed in Epic      ASSESSMENT / PLAN:   1. Encounter for Medicare annual wellness exam  Screening and preventative care discussed.  Colonoscopy is due in February of next year and she will contact us for orders when needed.    2. Need for 23-polyvalent pneumococcal polysaccharide vaccine  - Pneumococcal vaccine 23 valent PPSV23  (Pneumovax) [40419]  - ADMIN 1st VACCINE    3. Influenza vaccine needed  - FLU VACCINE, INCREASED ANTIGEN, PRESV FREE, AGE 65+ [65765]  - EA ADD'L VACCINE    4. Lipid screening  Return to clinic for fasting labs  - Lipid panel reflex to direct LDL Fasting; Future    5. Screening for diabetes mellitus  Return to clinic for fasting labs  - **Comprehensive metabolic panel FUTURE anytime; Future    6. Screening, anemia, deficiency, iron  Return to clinic for fasting labs  - **CBC with platelets FUTURE anytime; Future    7. Screening for thyroid disorder  Return to clinic for fasting labs  - **TSH with free T4 reflex FUTURE anytime; Future    COUNSELING:  Reviewed preventive health counseling, as reflected in patient instructions       Regular exercise       Healthy diet/nutrition       Vision screening       Dental care       Bladder control       Fall risk prevention       Osteoporosis Prevention/Bone Health       Colon cancer screening    Estimated body mass index is 21.63 kg/m  as calculated from the following:    Height as of this encounter: 1.638 m (5' 4.5\").    Weight as of this encounter: 58.1 kg (128 lb).        She reports that she has never smoked. She has never used smokeless tobacco.    Appropriate preventive services were discussed with this patient, including applicable screening as appropriate for cardiovascular disease, diabetes, osteopenia/osteoporosis, and glaucoma.  As appropriate for age/gender, discussed screening for colorectal cancer, prostate cancer, breast cancer, and cervical cancer. Checklist reviewing preventive " services available has been given to the patient.    Reviewed patients plan of care and provided an AVS. The Basic Care Plan (routine screening as documented in Health Maintenance) for Елена meets the Care Plan requirement. This Care Plan has been established and reviewed with the Patient.    Counseling Resources:  ATP IV Guidelines  Pooled Cohorts Equation Calculator  Breast Cancer Risk Calculator  BRCA-Related Cancer Risk Assessment: FHS-7 Tool  FRAX Risk Assessment  ICSI Preventive Guidelines  Dietary Guidelines for Americans, 2010  [employer]'s MyPlate  ASA Prophylaxis  Lung CA Screening    Tali Hamilton MD  [facility]      Patient Instructions     Return to clinic for fasting labs - schedule lab appointment.    Flu vaccine and Pneumonia vaccine today.    Follow up with Dermatology as planned.            This chart was documented by provider using a voice activated software called Dragon in addition to manual typing. There may be vocabulary errors or other grammatical errors due to this.

## 2020-09-08 ENCOUNTER — VIRTUAL VISIT (OUTPATIENT)
Dept: FAMILY MEDICINE | Facility: OTHER | Age: 65
End: 2020-09-08

## 2020-09-08 NOTE — PROGRESS NOTES
"Date: 2020 10:36:26  Clinician: Nicholas Yanez  Clinician NPI: [id number]  Patient: Елена Mix  Patient : 1955  Patient Address: [address] [place] [address] [place] [address]  Patient Phone: [phone]  Visit Protocol: Eczema  Patient Summary:  Елена is a 64 year old ( : 1955 ) female who initiated a Visit for evaluation of contact dermatitis. When asked the question \"Please sign me up to receive news, health information and promotions. \", Елена responded \"Yes\".    Images of her skin condition were uploaded.  Her symptoms started 1-3 days ago. The rash is located on her legs. The rash is red in color.   The affected area has drainage. It feels itchy, warm to touch, burning, and numb. The symptoms interfere with her sleep.   Symptom details     Redness: The redness has not rapidly increased in size.    Drainage: The color of the drainage is clear.     Denied symptoms include sores, dry skin, scabs, blisters, tender to touch, pain, crusts, flaky skin, and scaly skin. Елена does not feel feverish.   Treatments or home remedies used to relieve the symptoms as reported by the patient (free text): THESE ARE BEE STINGS.  I have used Caladryl, Bactine, ice and today I put on Triamcinon cream for intense itching.  These stings are swollen and hard after 24 hours; the size of a small baseball.   Precipitating events  Елена did not come in contact with any irritants prior to the onset of her symptoms and has not been in close contact with anyone that has similar symptoms. She also did not spend time in a wooded area, swim, travel, or spend time in the sun just before her symptoms started.   Pertinent medical history  Елена has experienced this skin condition before. Her current skin condition does not come and go. The last time she experienced this skin condition was within the last 3 months.   Елена has a history of seasonal allergies or hay fever and eczema.   Ongoing medical " conditions were denied.   Елена does not smoke or use smokeless tobacco.   Additional information as reported by the patient (free text): Allegra doesn't help itching.  The OTC ointments and sprays are good for 10 minutes.  I'm concerned about swelling and the hardness of the area of my skin that was stung by these 2 bee stings.     MEDICATIONS: Allegra-D 24 Hour oral, ALLERGIES: NKDA  Clinician Response:  Dear Елена,  Based on the information provided, you have a rash without a clear cause. A rash can be caused by diseases, irritating substances, allergies, and your genetics.  Although some skin rashes have a distinct appearance, many rash symptoms do not point to a specific cause. As long as symptoms are not a sign of a serious condition, treatment focuses on controlling symptoms.  Self care  Steps you can take to be as comfortable as possible:     Avoid scratching the rash    Take a lukewarm bath to soothe the skin (adding colloidal oatmeal can help even more)    Apply a moisturizing lotion immediately after bathing and frequently reapply throughout the day    Apply a cool, wet washcloth to your rash for 15 minutes several times a day    Use mild soap and laundry detergent    Choose clothing and bedding made of a breathable material like cotton    Do not use antibiotic creams or ointments unless recommended by a  provider     When to seek care  Please make an appointment to be seen in a clinic or urgent care if any of the following occur:     You develop new symptoms or your symptoms become worse    Your rash hasn't improved after 7 days    Symptoms are so severe that you are unable to sleep or do regular activities    You have areas of broken skin from scratching    You notice symptoms of a skin infection (e.g. Spreading redness, pain that is not improving, fever, warmth)      Diagnosis: Rash and other nonspecific skin eruption  Diagnosis ICD: R21  Additional Clinician Notes:   Please go to one of our  urgent care locations for evaluation for possible cellulitis..

## 2020-09-22 DIAGNOSIS — Z13.220 LIPID SCREENING: ICD-10-CM

## 2020-09-22 DIAGNOSIS — Z13.29 SCREENING FOR THYROID DISORDER: ICD-10-CM

## 2020-09-22 DIAGNOSIS — Z13.1 SCREENING FOR DIABETES MELLITUS: ICD-10-CM

## 2020-09-22 DIAGNOSIS — Z13.0 SCREENING, ANEMIA, DEFICIENCY, IRON: ICD-10-CM

## 2020-09-22 LAB
ALBUMIN SERPL-MCNC: 4 G/DL (ref 3.4–5)
ALP SERPL-CCNC: 75 U/L (ref 40–150)
ALT SERPL W P-5'-P-CCNC: 26 U/L (ref 0–50)
ANION GAP SERPL CALCULATED.3IONS-SCNC: 5 MMOL/L (ref 3–14)
AST SERPL W P-5'-P-CCNC: 18 U/L (ref 0–45)
BILIRUB SERPL-MCNC: 0.3 MG/DL (ref 0.2–1.3)
BUN SERPL-MCNC: 15 MG/DL (ref 7–30)
CALCIUM SERPL-MCNC: 9 MG/DL (ref 8.5–10.1)
CHLORIDE SERPL-SCNC: 107 MMOL/L (ref 94–109)
CHOLEST SERPL-MCNC: 234 MG/DL
CO2 SERPL-SCNC: 28 MMOL/L (ref 20–32)
CREAT SERPL-MCNC: 0.76 MG/DL (ref 0.52–1.04)
ERYTHROCYTE [DISTWIDTH] IN BLOOD BY AUTOMATED COUNT: 11.6 % (ref 10–15)
GFR SERPL CREATININE-BSD FRML MDRD: 81 ML/MIN/{1.73_M2}
GLUCOSE SERPL-MCNC: 93 MG/DL (ref 70–99)
HCT VFR BLD AUTO: 45.5 % (ref 35–47)
HDLC SERPL-MCNC: 78 MG/DL
HGB BLD-MCNC: 14.9 G/DL (ref 11.7–15.7)
LDLC SERPL CALC-MCNC: 130 MG/DL
MCH RBC QN AUTO: 31.2 PG (ref 26.5–33)
MCHC RBC AUTO-ENTMCNC: 32.7 G/DL (ref 31.5–36.5)
MCV RBC AUTO: 95 FL (ref 78–100)
NONHDLC SERPL-MCNC: 156 MG/DL
PLATELET # BLD AUTO: 242 10E9/L (ref 150–450)
POTASSIUM SERPL-SCNC: 4.5 MMOL/L (ref 3.4–5.3)
PROT SERPL-MCNC: 8 G/DL (ref 6.8–8.8)
RBC # BLD AUTO: 4.77 10E12/L (ref 3.8–5.2)
SODIUM SERPL-SCNC: 140 MMOL/L (ref 133–144)
TRIGL SERPL-MCNC: 128 MG/DL
TSH SERPL DL<=0.005 MIU/L-ACNC: 3.55 MU/L (ref 0.4–4)
WBC # BLD AUTO: 4.4 10E9/L (ref 4–11)

## 2020-09-22 PROCEDURE — 80053 COMPREHEN METABOLIC PANEL: CPT | Performed by: FAMILY MEDICINE

## 2020-09-22 PROCEDURE — 80061 LIPID PANEL: CPT | Performed by: FAMILY MEDICINE

## 2020-09-22 PROCEDURE — 85027 COMPLETE CBC AUTOMATED: CPT | Performed by: FAMILY MEDICINE

## 2020-09-22 PROCEDURE — 36415 COLL VENOUS BLD VENIPUNCTURE: CPT | Performed by: FAMILY MEDICINE

## 2020-09-22 PROCEDURE — 84443 ASSAY THYROID STIM HORMONE: CPT | Performed by: FAMILY MEDICINE

## 2020-09-28 NOTE — RESULT ENCOUNTER NOTE
Your thyroid testing is normal.  Your cholesterol level is similar to previous.  When factoring these levels along with other risk factors your overall risk for heart disease remains low.  Your blood sugar is normal and liver and kidney testing are also normal.  Your blood cell counts are all normal as well.  Please call or MyChart message me if you have any questions.  PSK

## 2020-10-06 ENCOUNTER — MYC MEDICAL ADVICE (OUTPATIENT)
Dept: FAMILY MEDICINE | Facility: CLINIC | Age: 65
End: 2020-10-06

## 2020-10-06 NOTE — TELEPHONE ENCOUNTER
E-visit/Virtual visit/Telephone visit instructions given to Елена to further discuss COVID symptoms.     Bria Contreras RN, BSN, PHN

## 2020-10-08 ENCOUNTER — VIRTUAL VISIT (OUTPATIENT)
Dept: FAMILY MEDICINE | Facility: CLINIC | Age: 65
End: 2020-10-08
Payer: COMMERCIAL

## 2020-10-08 DIAGNOSIS — J31.0 RHINITIS, UNSPECIFIED TYPE: ICD-10-CM

## 2020-10-08 DIAGNOSIS — R53.83 FATIGUE, UNSPECIFIED TYPE: Primary | ICD-10-CM

## 2020-10-08 DIAGNOSIS — Z20.822 SUSPECTED COVID-19 VIRUS INFECTION: ICD-10-CM

## 2020-10-08 PROCEDURE — 99213 OFFICE O/P EST LOW 20 MIN: CPT | Mod: 95 | Performed by: FAMILY MEDICINE

## 2020-10-08 NOTE — PATIENT INSTRUCTIONS
Instructions for Patients  It is recommended that you have a test for coronavirus (COVID-19). This illness can cause fever, cough and trouble breathing. Many people get a mild case and get better on their own. Some people can get very sick.     Please follow these steps:    1. We will call to schedule your test.  2. A member of our care team will ask you some questions. Then, they will use a swab to collect samples from your nose and throat.     Our testing team will send you your test results.    How can I protect others?    Stay home and away from others (self-isolate) until:    You ve had no fever--and no medicine that reduces fever--for 1 full day (24 hours). And      Your other symptoms have resolved (gotten better). For example, your cough or breathing has improved. And     At least 10 days have passed since your symptoms started.    Stay at least 6 feet away from others. (If someone will drive you to your test, stay in the backseat, as far away from the  as you can.)     Don t go to work, school or anywhere else. When it s time for your test, go straight to the testing site. Don t make any stops on the way there or back.     Wash your hands and face often. Use soap and water.     Cover your mouth and nose with a mask, tissue or washcloth.     Don t touch anyone. No hugging, kissing or handshakes.    How can I take care of myself?    1. Get lots of rest. Drink extra fluids (unless a doctor has told you not to).     2. Take Tylenol (acetaminophen) for fever or pain. If you have liver or kidney problems, ask your family doctor if it's okay to take Tylenol.     Adults can take either:     650 mg (two 325 mg pills) every 4 to 6 hours, or     1,000 mg (two 500 mg pills) every 8 hours as needed.     Note: Don't take more than 3,000 mg in one day.   Acetaminophen is found in many medicines (both prescribed and over-the-counter medicines). Read all labels to be sure you don't take too much.   For children,  check the Tylenol bottle for the right dose. The dose is based on  the child's age or weight.    3. If you have other health problems (like cancer, heart failure, an organ transplant or severe kidney disease): Call your specialty clinic if you don't feel better in the next 2 days.    4. Know when to call 911: If your breathing is so bad that it keeps you from doing normal activities, call 911 or go to the emergency room. Tell them that you've been staying home and may have COVID-19.      Thank you for limiting contact with others, wearing a simple mask to cover your cough, practice good hand hygiene habits and accessing our virtual services where possible to limit the spread of this virus.    For more information about COVID19 and options for caring for yourself at home, please visit the CDC website at https://www.cdc.gov/coronavirus/2019-ncov/about/steps-when-sick.html  For more options for care at [facility], please visit our website at [email or web address]

## 2020-10-08 NOTE — PROGRESS NOTES
"Елена Mix is a 65 year old female who is being evaluated via a billable telephone visit.      The patient has been notified of following:     \"This telephone visit will be conducted via a call between you and your physician/provider. We have found that certain health care needs can be provided without the need for a physical exam.  This service lets us provide the care you need with a short phone conversation.  If a prescription is necessary we can send it directly to your pharmacy.  If lab work is needed we can place an order for that and you can then stop by our lab to have the test done at a later time.    Telephone visits are billed at different rates depending on your insurance coverage. During this emergency period, for some insurers they may be billed the same as an in-person visit.  Please reach out to your insurance provider with any questions.    If during the course of the call the physician/provider feels a telephone visit is not appropriate, you will not be charged for this service.\"    Patient has given verbal consent for Telephone visit?  Yes    What phone number would you like to be contacted at? [phone]    How would you like to obtain your AVS? Talia Vela     Елена Mix is a 65 year old female who presents via phone visit today for the following health issues:    HPI       Concern for COVID-19    Patient exposed to son who has positive covid test on Saturday   About how many days ago did these symptoms start? 5 days   Is this your first visit for this illness? Yes  In the 14 days before your symptoms started, have you had close contact with someone with COVID-19 (Coronavirus)? Yes, I have been in contact with someone who has COVID-19/Coronavirus (confirmed by lab test).  Do you have a fever or chills? No  Are you having new or worsening difficulty breathing? No  Do you have new or worsening cough? Yes, it's a dry cough. (not new cough)  Have you had any new or " unexplained body aches? No    Have you experienced any of the following NEW symptoms?    Headache: YES- mild/ pt also has sinus pressure    Sore throat: No    Loss of taste or smell: No    Chest pain: No    Diarrhea: No    Rash: No  What treatments have you tried? Flonase gives pt cough   Who do you live with? alone  Are you, or a household member, a healthcare worker or a ? No  Do you live in a nursing home, group home, or shelter? No  Do you have a way to get food/medications if quarantined? Yes, I have a friend or family member who can help me.        was with son and his family last weekend. Son had been exposed and found out the day later.   Son then tested positive     Have seasonal allergies right now. Fatigue and runny nose. Fatigue is new but runny nose has been present for a while. Yesterday so fatigued that had to rest most of the day. Today that is better.         Review of Systems          Objective          Vitals:  No vitals were obtained today due to virtual visit.    healthy, alert and no distress  PSYCH: Alert and oriented times 3; coherent speech, normal   rate and volume, able to articulate logical thoughts, able   to abstract reason, no tangential thoughts, no hallucinations   or delusions  Her affect is normal and pleasant  RESP: No cough, no audible wheezing, able to talk in full sentences  Remainder of exam unable to be completed due to telephone visits            Assessment/Plan:    Assessment & Plan     Елена was seen today for suspected covid.    Diagnoses and all orders for this visit:    Fatigue, unspecified type  -     Symptomatic COVID-19 Virus (Coronavirus) by PCR; Future    Rhinitis, unspecified type  -     Symptomatic COVID-19 Virus (Coronavirus) by PCR; Future    Suspected COVID-19 virus infection  -     Symptomatic COVID-19 Virus (Coronavirus) by PCR; Future           Recent close contact exposure to Covid 19+ patient.  Mildly symptomatic.  Reviewed symptomatic  cares, isolation, and coronavirus testing protocol.  Reviewed red flag symptoms that would precipitate the need for routine, urgent or emergent f/u   Patient Instructions     Instructions for Patients  It is recommended that you have a test for coronavirus (COVID-19). This illness can cause fever, cough and trouble breathing. Many people get a mild case and get better on their own. Some people can get very sick.     Please follow these steps:    1. We will call to schedule your test.  2. A member of our care team will ask you some questions. Then, they will use a swab to collect samples from your nose and throat.     Our testing team will send you your test results.    How can I protect others?    Stay home and away from others (self-isolate) until:    You ve had no fever--and no medicine that reduces fever--for 1 full day (24 hours). And      Your other symptoms have resolved (gotten better). For example, your cough or breathing has improved. And     At least 10 days have passed since your symptoms started.    Stay at least 6 feet away from others. (If someone will drive you to your test, stay in the backseat, as far away from the  as you can.)     Don t go to work, school or anywhere else. When it s time for your test, go straight to the testing site. Don t make any stops on the way there or back.     Wash your hands and face often. Use soap and water.     Cover your mouth and nose with a mask, tissue or washcloth.     Don t touch anyone. No hugging, kissing or handshakes.    How can I take care of myself?    1. Get lots of rest. Drink extra fluids (unless a doctor has told you not to).     2. Take Tylenol (acetaminophen) for fever or pain. If you have liver or kidney problems, ask your family doctor if it's okay to take Tylenol.     Adults can take either:     650 mg (two 325 mg pills) every 4 to 6 hours, or     1,000 mg (two 500 mg pills) every 8 hours as needed.     Note: Don't take more than 3,000 mg in  one day.   Acetaminophen is found in many medicines (both prescribed and over-the-counter medicines). Read all labels to be sure you don't take too much.   For children, check the Tylenol bottle for the right dose. The dose is based on  the child's age or weight.    3. If you have other health problems (like cancer, heart failure, an organ transplant or severe kidney disease): Call your specialty clinic if you don't feel better in the next 2 days.    4. Know when to call 911: If your breathing is so bad that it keeps you from doing normal activities, call 911 or go to the emergency room. Tell them that you've been staying home and may have COVID-19.      Thank you for limiting contact with others, wearing a simple mask to cover your cough, practice good hand hygiene habits and accessing our virtual services where possible to limit the spread of this virus.    For more information about COVID19 and options for caring for yourself at home, please visit the CDC website at https://www.cdc.gov/coronavirus/2019-ncov/about/steps-when-sick.html  For more options for care at [facility], please visit our website at [email or web address]       Return in about 1 week (around 10/15/2020), or if symptoms worsen or fail to improve.    Jana Ramirez MD  [facility]    Phone call duration:  9 minutes

## 2020-10-11 ENCOUNTER — AMBULATORY - HEALTHEAST (OUTPATIENT)
Dept: FAMILY MEDICINE | Facility: CLINIC | Age: 65
End: 2020-10-11

## 2020-10-11 ENCOUNTER — AMBULATORY - HEALTHEAST (OUTPATIENT)
Dept: INTERNAL MEDICINE | Facility: CLINIC | Age: 65
End: 2020-10-11

## 2020-10-11 DIAGNOSIS — Z20.822 SUSPECTED COVID-19 VIRUS INFECTION: ICD-10-CM

## 2020-10-11 DIAGNOSIS — R53.83 FATIGUE, UNSPECIFIED TYPE: ICD-10-CM

## 2020-10-11 DIAGNOSIS — J31.0 RHINITIS, UNSPECIFIED TYPE: ICD-10-CM

## 2020-10-13 ENCOUNTER — COMMUNICATION - HEALTHEAST (OUTPATIENT)
Dept: SCHEDULING | Facility: CLINIC | Age: 65
End: 2020-10-13

## 2021-01-18 ENCOUNTER — MYC MEDICAL ADVICE (OUTPATIENT)
Dept: FAMILY MEDICINE | Facility: CLINIC | Age: 66
End: 2021-01-18

## 2021-01-18 DIAGNOSIS — Z12.11 SCREEN FOR COLON CANCER: Primary | ICD-10-CM

## 2021-01-29 DIAGNOSIS — Z11.59 ENCOUNTER FOR SCREENING FOR OTHER VIRAL DISEASES: Primary | ICD-10-CM

## 2021-02-16 RX ORDER — SODIUM, POTASSIUM,MAG SULFATES 17.5-3.13G
1 SOLUTION, RECONSTITUTED, ORAL ORAL SEE ADMIN INSTRUCTIONS
Qty: 2 BOTTLE | Refills: 0 | Status: SHIPPED | OUTPATIENT
Start: 2021-02-16 | End: 2021-09-16

## 2021-02-16 RX ORDER — BISACODYL 5 MG/1
15 TABLET, DELAYED RELEASE ORAL SEE ADMIN INSTRUCTIONS
Qty: 3 TABLET | Refills: 0 | Status: SHIPPED | OUTPATIENT
Start: 2021-02-16 | End: 2021-09-16

## 2021-02-16 ASSESSMENT — MIFFLIN-ST. JEOR: SCORE: 1117.62

## 2021-02-19 DIAGNOSIS — Z11.59 ENCOUNTER FOR SCREENING FOR OTHER VIRAL DISEASES: ICD-10-CM

## 2021-02-19 LAB
SARS-COV-2 RNA RESP QL NAA+PROBE: NORMAL
SPECIMEN SOURCE: NORMAL

## 2021-02-19 PROCEDURE — U0005 INFEC AGEN DETEC AMPLI PROBE: HCPCS | Performed by: INTERNAL MEDICINE

## 2021-02-19 PROCEDURE — U0003 INFECTIOUS AGENT DETECTION BY NUCLEIC ACID (DNA OR RNA); SEVERE ACUTE RESPIRATORY SYNDROME CORONAVIRUS 2 (SARS-COV-2) (CORONAVIRUS DISEASE [COVID-19]), AMPLIFIED PROBE TECHNIQUE, MAKING USE OF HIGH THROUGHPUT TECHNOLOGIES AS DESCRIBED BY CMS-2020-01-R: HCPCS | Performed by: INTERNAL MEDICINE

## 2021-02-20 LAB
LABORATORY COMMENT REPORT: NORMAL
SARS-COV-2 RNA RESP QL NAA+PROBE: NEGATIVE
SPECIMEN SOURCE: NORMAL

## 2021-02-23 ENCOUNTER — HOSPITAL ENCOUNTER (OUTPATIENT)
Facility: AMBULATORY SURGERY CENTER | Age: 66
Discharge: HOME | End: 2021-02-23
Attending: INTERNAL MEDICINE | Admitting: INTERNAL MEDICINE
Payer: COMMERCIAL

## 2021-02-23 VITALS
TEMPERATURE: 96 F | HEART RATE: 71 BPM | HEIGHT: 65 IN | BODY MASS INDEX: 21.66 KG/M2 | SYSTOLIC BLOOD PRESSURE: 133 MMHG | OXYGEN SATURATION: 99 % | DIASTOLIC BLOOD PRESSURE: 84 MMHG | WEIGHT: 130 LBS | RESPIRATION RATE: 18 BRPM

## 2021-02-23 DIAGNOSIS — Z12.11 SCREEN FOR COLON CANCER: Primary | ICD-10-CM

## 2021-02-23 LAB — COLONOSCOPY: NORMAL

## 2021-02-23 PROCEDURE — G8918 PT W/O PREOP ORDER IV AB PRO: HCPCS

## 2021-02-23 PROCEDURE — G8907 PT DOC NO EVENTS ON DISCHARG: HCPCS

## 2021-02-23 PROCEDURE — 45380 COLONOSCOPY AND BIOPSY: CPT

## 2021-02-23 PROCEDURE — 88305 TISSUE EXAM BY PATHOLOGIST: CPT | Mod: GC | Performed by: PATHOLOGY

## 2021-02-23 RX ORDER — PROCHLORPERAZINE MALEATE 5 MG
5 TABLET ORAL EVERY 6 HOURS PRN
Status: DISCONTINUED | OUTPATIENT
Start: 2021-02-23 | End: 2021-02-24 | Stop reason: HOSPADM

## 2021-02-23 RX ORDER — DIPHENHYDRAMINE HYDROCHLORIDE 50 MG/ML
INJECTION INTRAMUSCULAR; INTRAVENOUS PRN
Status: DISCONTINUED | OUTPATIENT
Start: 2021-02-23 | End: 2021-02-23 | Stop reason: HOSPADM

## 2021-02-23 RX ORDER — NALOXONE HYDROCHLORIDE 0.4 MG/ML
0.4 INJECTION, SOLUTION INTRAMUSCULAR; INTRAVENOUS; SUBCUTANEOUS
Status: DISCONTINUED | OUTPATIENT
Start: 2021-02-23 | End: 2021-02-24 | Stop reason: HOSPADM

## 2021-02-23 RX ORDER — FLUMAZENIL 0.1 MG/ML
0.2 INJECTION, SOLUTION INTRAVENOUS
Status: SHIPPED | OUTPATIENT
Start: 2021-02-23 | End: 2021-02-23

## 2021-02-23 RX ORDER — NALOXONE HYDROCHLORIDE 0.4 MG/ML
0.2 INJECTION, SOLUTION INTRAMUSCULAR; INTRAVENOUS; SUBCUTANEOUS
Status: DISCONTINUED | OUTPATIENT
Start: 2021-02-23 | End: 2021-02-24 | Stop reason: HOSPADM

## 2021-02-23 RX ORDER — ONDANSETRON 2 MG/ML
4 INJECTION INTRAMUSCULAR; INTRAVENOUS
Status: COMPLETED | OUTPATIENT
Start: 2021-02-23 | End: 2021-02-23

## 2021-02-23 RX ORDER — ONDANSETRON 2 MG/ML
4 INJECTION INTRAMUSCULAR; INTRAVENOUS EVERY 6 HOURS PRN
Status: DISCONTINUED | OUTPATIENT
Start: 2021-02-23 | End: 2021-02-24 | Stop reason: HOSPADM

## 2021-02-23 RX ORDER — FENTANYL CITRATE 50 UG/ML
INJECTION, SOLUTION INTRAMUSCULAR; INTRAVENOUS PRN
Status: DISCONTINUED | OUTPATIENT
Start: 2021-02-23 | End: 2021-02-23 | Stop reason: HOSPADM

## 2021-02-23 RX ORDER — LIDOCAINE 40 MG/G
CREAM TOPICAL
Status: DISCONTINUED | OUTPATIENT
Start: 2021-02-23 | End: 2021-02-24 | Stop reason: HOSPADM

## 2021-02-23 RX ORDER — SODIUM CHLORIDE, SODIUM LACTATE, POTASSIUM CHLORIDE, CALCIUM CHLORIDE 600; 310; 30; 20 MG/100ML; MG/100ML; MG/100ML; MG/100ML
INJECTION, SOLUTION INTRAVENOUS CONTINUOUS PRN
Status: COMPLETED | OUTPATIENT
Start: 2021-02-23 | End: 2021-02-23

## 2021-02-23 RX ORDER — ONDANSETRON 4 MG/1
4 TABLET, ORALLY DISINTEGRATING ORAL EVERY 6 HOURS PRN
Status: DISCONTINUED | OUTPATIENT
Start: 2021-02-23 | End: 2021-02-24 | Stop reason: HOSPADM

## 2021-02-23 NOTE — DISCHARGE INSTRUCTIONS
SCOPALAMINE Patch placed behind RIGHT ear for nausea relief.  Remove the patch in 24-26 hours.  Dispose in trash and wash hands carefully.     Information for Patients Discharging with a Transderm Scopolamine Patch       Dry mouth is a common side effect.    Drowsiness is another common side effect especially when combined with pain medication.  Please avoid activities that require mental alertness such as driving a car or making important legal decisions.    Since Scopolamine can cause temporary dilation of the pupils and blurred vision if it comes in contact with the eyes; be sure to wash your hands thoroughly with soap and water immediately after handling the patch.   When you remove your patch, please stick it to a tissue or paper towel for disposal.      Remove the patch immediately and contact a physician in the unlikely event that you experience symptoms of acute glaucoma (pain and reddening of the eyes, accompanied by dilated pupils).  Remove the patch if you develop any difficulties urinating.  If you cannot urinate after removing your patch, please notify your surgeon.

## 2021-02-25 LAB — COPATH REPORT: NORMAL

## 2021-05-06 ENCOUNTER — RECORDS - HEALTHEAST (OUTPATIENT)
Dept: TELEHEALTH | Facility: CLINIC | Age: 66
End: 2021-05-06

## 2021-05-06 ENCOUNTER — COMMUNICATION - HEALTHEAST (OUTPATIENT)
Dept: FAMILY MEDICINE | Facility: CLINIC | Age: 66
End: 2021-05-06

## 2021-06-17 NOTE — TELEPHONE ENCOUNTER
Telephone Encounter by Gloria Brown MD at 5/6/2021  5:40 PM     Author: Gloria Brown MD Service: -- Author Type: Physician    Filed: 5/6/2021  5:40 PM Encounter Date: 5/6/2021 Status: Signed    : Gloria Brown MD (Physician)    From: Елена Mix  Sent: 4/24/2021  6:47 AM CDT  To: Gloria Brown MD  Subject: RE:please schedule your covid vaccine appointment    I received my COVID vaccinations in March at a [facility] clinic.

## 2021-09-16 ENCOUNTER — OFFICE VISIT (OUTPATIENT)
Dept: FAMILY MEDICINE | Facility: CLINIC | Age: 66
End: 2021-09-16
Payer: COMMERCIAL

## 2021-09-16 VITALS
SYSTOLIC BLOOD PRESSURE: 126 MMHG | HEART RATE: 79 BPM | OXYGEN SATURATION: 100 % | DIASTOLIC BLOOD PRESSURE: 70 MMHG | WEIGHT: 130 LBS | RESPIRATION RATE: 12 BRPM | HEIGHT: 65 IN | BODY MASS INDEX: 21.66 KG/M2 | TEMPERATURE: 97.9 F

## 2021-09-16 DIAGNOSIS — J01.90 ACUTE SINUSITIS WITH SYMPTOMS > 10 DAYS: Primary | ICD-10-CM

## 2021-09-16 LAB
RSV AG SPEC QL: NEGATIVE
SARS-COV-2 RNA RESP QL NAA+PROBE: NEGATIVE

## 2021-09-16 PROCEDURE — 99213 OFFICE O/P EST LOW 20 MIN: CPT | Performed by: FAMILY MEDICINE

## 2021-09-16 PROCEDURE — U0005 INFEC AGEN DETEC AMPLI PROBE: HCPCS | Performed by: FAMILY MEDICINE

## 2021-09-16 PROCEDURE — U0003 INFECTIOUS AGENT DETECTION BY NUCLEIC ACID (DNA OR RNA); SEVERE ACUTE RESPIRATORY SYNDROME CORONAVIRUS 2 (SARS-COV-2) (CORONAVIRUS DISEASE [COVID-19]), AMPLIFIED PROBE TECHNIQUE, MAKING USE OF HIGH THROUGHPUT TECHNOLOGIES AS DESCRIBED BY CMS-2020-01-R: HCPCS | Performed by: FAMILY MEDICINE

## 2021-09-16 PROCEDURE — 87807 RSV ASSAY W/OPTIC: CPT | Performed by: FAMILY MEDICINE

## 2021-09-16 RX ORDER — IPRATROPIUM BROMIDE 21 UG/1
2 SPRAY, METERED NASAL EVERY 12 HOURS
Qty: 30 ML | Refills: 0 | Status: SHIPPED | OUTPATIENT
Start: 2021-09-16 | End: 2021-10-20

## 2021-09-16 ASSESSMENT — MIFFLIN-ST. JEOR: SCORE: 1128.05

## 2021-09-16 ASSESSMENT — PAIN SCALES - GENERAL: PAINLEVEL: NO PAIN (0)

## 2021-09-16 NOTE — RESULT ENCOUNTER NOTE
Please inform of results if patient has not viewed in [employer].    Your RSV lab results came back normal. Your Covid test results are pending.    Please call the clinic with any questions you may have.     Have a great day,    Dr. Woodruff

## 2021-09-16 NOTE — PROGRESS NOTES
Assessment & Plan   1. Acute sinusitis with symptoms > 10 days: Given duration of symptoms, ongoing sinus pain and pressure with postnasal drip will treat with Atrovent and Augmentin.  Check for URI viruses such as RSV and Covid.  Patient agreeable to plan.  Discussed isolation until that time.  Recommend using Mucinex DM or other similar for cough suppression.  Continue use of other over-the-counter medication she has been using.  - ipratropium (ATROVENT) 0.03 % nasal spray; Spray 2 sprays into both nostrils every 12 hours  Dispense: 30 mL; Refill: 0  - amoxicillin-clavulanate (AUGMENTIN) 875-125 MG tablet; Take 1 tablet by mouth 2 times daily for 10 days  Dispense: 20 tablet; Refill: 0  - Symptomatic COVID-19 Virus (Coronavirus) by PCR Nose  - RSV rapid antigen    Return in about 1 week (around 9/23/2021), or if symptoms worsen or fail to improve.    Sancho Mendosa MD  [facility]    This chart is completed utilizing dictation software; typos and/or incorrect word substitutions may unintentionally occur.      Subjective     Елена Mix is a 65 year old female who presents to clinic today for the following health issues:    HPI     Acute Illness  Acute illness concerns: sinus infection and did do a at home covid test 8/14 that was negative   Onset/Duration: about 30 days   Symptoms:  Fever: no  Chills/Sweats: no  Headache (location?): YES  Sinus Pressure: YES  Conjunctivitis:  No but watery   Ear Pain: YES- sensitive   Rhinorrhea: YES  Congestion: YES  Sore Throat: YES  Cough: YES-non-productive but starting to get looser and is green colored phlegm when it happens   Wheeze: no  Decreased Appetite: no  Nausea: no  Vomiting: no  Diarrhea: no  Dysuria/Freq.: no  Dysuria or Hematuria: no  Fatigue/Achiness: YES- fatigue as she is not sleeping well   Sick/Strep Exposure: YES- granddaughter has RSV  Therapies tried and outcome: warm shower, allegra, advil, cough drops,  "elderberry chews    Patient comes in for concern of over 30 days of symptoms.  Initially started approximately 8/14/2021 after exposure to grandson who tested positive for RSV.  She does note sinus pain and pressure with postnasal drip symptoms.  She does note ongoing headache treatable with ibuprofen.  She has recently developed a productive cough bringing up phlegm.  She has some chest tightness; however, no shortness of breath or chest pain.  Denies any rash or body aches.    Has been using her allergy medications including Flonase and Allegra as well as saline rinses and humid air without significant provement.  She has no worsening symptoms over the last week or so.    Denies any new sick contacts or exposures other than those mentioned.    She does have an upcoming football game she wants to attend this weekend as well as traveling to [place] next week or so.  The [place] trip has been rescheduled already.    Review of Systems   Constitutional, HEENT, lymph, Derm, MSK, cardiovascular, pulmonary, gi and gu systems are negative, except as otherwise noted.      Objective    /70   Pulse 79   Temp 97.9  F (36.6  C) (Temporal)   Resp 12   Ht 1.655 m (5' 5.16\")   Wt 59 kg (130 lb)   LMP  (LMP Unknown)   SpO2 100%   BMI 21.53 kg/m    Body mass index is 21.53 kg/m .  Physical Exam   General: Appears well and in no acute distress.  Heme/Lymph: No supraclavicular, anterior, or posterior cervical lymphadenopathy.   HEENT: TMs and ear canals normal.  Eyes grossly normal to inspection. Extraocular movements intact. Pupils equal, round, and reactive to light. Mucous membranes moist. No ulcers or lesions noted in the oropharynx.   Cardiovascular: Regular rate and rhythm, normal S1 and S2 without murmur. No extra heartsounds or friction rub. Radial pulses present and equal bilaterally.  Respiratory: Lungs clear to auscultation bilaterally. No wheezing or crackles. No prolonged expiration. Symmetrical chest " rise.  Musculoskeletal: No gross extremity deformities. No peripheral edema. Normal muscle bulk.     Labs: Pending

## 2021-09-17 NOTE — RESULT ENCOUNTER NOTE
Please inform of results if patient has not viewed in [employer].    Your COVID lab results came back normal.    Please call the clinic with any questions you may have.     Have a great day,    Dr. Woodruff

## 2021-10-16 NOTE — PROGRESS NOTES
"SUBJECTIVE:   Елена Mix is a 66 year old female who presents for Preventive Visit.    {  Patient has been advised of split billing requirements and indicates understanding: Yes   Are you in the first 12 months of your Medicare coverage?  No    Healthy Habits:     In general, how would you rate your overall health?  Excellent    Frequency of exercise:  4-5 days/week    Duration of exercise:  15-30 minutes    Do you usually eat at least 4 servings of fruit and vegetables a day, include whole grains    & fiber and avoid regularly eating high fat or \"junk\" foods?  Yes    Taking medications regularly:  Yes    Medication side effects:  Not applicable    Ability to successfully perform activities of daily living:  No assistance needed    Home Safety:  No safety concerns identified    Hearing Impairment:  No hearing concerns    In the past 6 months, have you been bothered by leaking of urine? Yes    In general, how would you rate your overall mental or emotional health?  Excellent      PHQ-2 Total Score: 0    Additional concerns today:  Yes    Do you feel safe in your environment? Yes    Have you ever done Advance Care Planning? (For example, a Health Directive, POLST, or a discussion with a medical provider or your loved ones about your wishes): No, advance care planning information given to patient to review.  Patient plans to discuss their wishes with loved ones or provider.        Fall risk  Fallen 2 or more times in the past year?: No  Any fall with injury in the past year?: No  click delete button to remove this line now  Cognitive Screening   1) Repeat 3 items (Leader, Season, Table)    2) Clock draw: NORMAL  3) 3 item recall: Recalls 3 objects  Results: 3 items recalled: COGNITIVE IMPAIRMENT LESS LIKELY    Mini-CogTM Copyright [employer]. Licensed by the author for use in [facility]; reprinted with permission (pramod@.[email or web address]). All rights reserved.      Do you have sleep apnea, excessive snoring or " daytime drowsiness?: daytime drowsiness    Reviewed and updated as needed this visit by clinical staff  Tobacco  Allergies  Meds   Med Hx  Surg Hx  Fam Hx  Soc Hx        Reviewed and updated as needed this visit by Provider                Social History     Tobacco Use     Smoking status: Never Smoker     Smokeless tobacco: Never Used   Substance Use Topics     Alcohol use: Yes     Comment: 1 glass of wine 2-3 x's a week.     If you drink alcohol do you typically have >3 drinks per day or >7 drinks per week? No      Current providers sharing in care for this patient include:   Patient Care Team:  Jana Ramirez MD as PCP - General (Family Practice)  Marv Bolaños MD as Assigned Surgical Provider  Jana Ramirez MD as Assigned PCP    The following health maintenance items are reviewed in Epic and correct as of today:  Health Maintenance Due   Topic Date Due     ANNUAL REVIEW OF HM ORDERS  Never done     DEXA  01/07/2021       Any new diagnosis of family breast, ovarian, or bowel cancer? No    FHS-7: No flowsheet data found.    Mammogram Screening: Recommended annual mammography  Pertinent mammograms are reviewed under the imaging tab.    Review of Systems   Constitutional: Negative for chills and fever.   HENT: Negative for congestion, ear pain, hearing loss and sore throat.    Eyes: Positive for visual disturbance. Negative for pain.   Respiratory: Negative for cough and shortness of breath.    Cardiovascular: Negative for chest pain, palpitations and peripheral edema.   Gastrointestinal: Negative for abdominal pain, constipation, diarrhea, heartburn, hematochezia and nausea.   Breasts:  Negative for tenderness, breast mass and discharge.   Genitourinary: Negative for dysuria, frequency, genital sores, hematuria, pelvic pain, urgency, vaginal bleeding and vaginal discharge.   Musculoskeletal: Positive for arthralgias and myalgias. Negative for joint swelling.   Skin:  "Negative for rash.   Neurological: Negative for dizziness, weakness, headaches and paresthesias.   Psychiatric/Behavioral: Negative for mood changes. The patient is not nervous/anxious.      Sleep has been sporadict  Fall asleep ok but will wake at 0300. Might sleep well for few weeks and then insomnia will start.   Notes etoh can impact sleep and overall using less etoh    Allergies more flared this season. More itching in sinuses/face. Ipratropium given at recent visit for sinuses helpful.     mammo in April    Getting floaters. Has vision exam plannned    Back pain again flaring with walking. Radiates to hips. Last MICK was 5 years ago. Chronic numnbess in foot     OBJECTIVE:   /84 (BP Location: Right arm, Patient Position: Sitting, Cuff Size: Adult Regular)   Pulse 65   Temp 97.7  F (36.5  C) (Oral)   Resp 18   Ht 1.632 m (5' 4.25\")   Wt 58.8 kg (129 lb 9.6 oz)   LMP  (LMP Unknown)   SpO2 99%   BMI 22.07 kg/m   Estimated body mass index is 22.07 kg/m  as calculated from the following:    Height as of this encounter: 1.632 m (5' 4.25\").    Weight as of this encounter: 58.8 kg (129 lb 9.6 oz).  Physical Exam  GENERAL: healthy, alert and no distress  EYES: Eyes grossly normal to inspection, PERRL and conjunctivae and sclerae normal  HENT: ear canals and TM's normal, nose and mouth without ulcers or lesions  NECK: no adenopathy, no asymmetry, masses, or scars and thyroid normal to palpation  RESP: lungs clear to auscultation - no rales, rhonchi or wheezes  BREAST: normal without masses, tenderness or nipple discharge and no palpable axillary masses or adenopathy  CV: regular rate and rhythm, normal S1 S2, no S3 or S4, no murmur, click or rub, no peripheral edema and peripheral pulses strong  ABDOMEN: soft, nontender, no hepatosplenomegaly, no masses and bowel sounds normal   (female): normal female external genitalia, normal urethral meatus, vaginal mucosa pink, moist, well rugated, and normal " "cervix/adnexa/uterus without masses or discharge  MS: no gross musculoskeletal defects noted, no edema  SKIN: no suspicious lesions or rashes  NEURO: Normal strength and tone, mentation intact and speech normal  PSYCH: mentation appears normal, affect normal/bright        ASSESSMENT / PLAN:   (Z00.00) Encounter for Medicare annual wellness exam  (primary encounter diagnosis)    Lumbar radiculopathy- admits not doing home exercises as faithfully as she had in the past but will try to increase. Offered referral back to PHYSICAL THERAPY or ortho but she will hold off for now.     (E28.39) Estrogen deficiency  Comment:   Plan: DX Hip/Pelvis/Spine            (J30.2) Seasonal allergic rhinitis, unspecified trigger  Comment:   Plan: refilled the atrovent spray. Also reviewed otc meds for allergy mgmt    (M85.80) Osteopenia, unspecified location  Comment: reviewed bone scan from last year  Plan: continue ca/vit d/wt bearing exercises    (G47.00) Insomnia, unspecified type  Comment:   Plan: reviewed sleep hygiene, minimize etoh. Consider CBT tx.       COUNSELING:  Reviewed preventive health counseling, as reflected in patient instructions       Regular exercise       Healthy diet/nutrition       Vision screening       Osteoporosis prevention/bone health       Advanced Planning     Estimated body mass index is 22.07 kg/m  as calculated from the following:    Height as of this encounter: 1.632 m (5' 4.25\").    Weight as of this encounter: 58.8 kg (129 lb 9.6 oz).        She reports that she has never smoked. She has never used smokeless tobacco.      Appropriate preventive services were discussed with this patient, including applicable screening as appropriate for cardiovascular disease, diabetes, osteopenia/osteoporosis, and glaucoma.  As appropriate for age/gender, discussed screening for colorectal cancer, prostate cancer, breast cancer, and cervical cancer. Checklist reviewing preventive services available has been given " to the patient.    Reviewed patients plan of care and provided an AVS. The Basic Care Plan (routine screening as documented in Health Maintenance) for Елена meets the Care Plan requirement. This Care Plan has been established and reviewed with the Patient.    Counseling Resources:  ATP IV Guidelines  Pooled Cohorts Equation Calculator  Breast Cancer Risk Calculator  Breast Cancer: Medication to Reduce Risk  FRAX Risk Assessment  ICSI Preventive Guidelines  Dietary Guidelines for Americans, 2010  [employer] MyPlate  ASA Prophylaxis  Lung CA Screening    Jana Ramirez MD  [facility]    Identified Health Risks:

## 2021-10-16 NOTE — PATIENT INSTRUCTIONS
"Check to see if you've done your mammogram this year   Be consistent with back exercises. Could consider repeat physical therapy or referral back to ortho.   Get eye exam          Patient Education   Personalized Prevention Plan  You are due for the preventive services outlined below.  Your care team is available to assist you in scheduling these services.  If you have already completed any of these items, please share that information with your care team to update in your medical record.  Health Maintenance Due   Topic Date Due     ANNUAL REVIEW OF HM ORDERS  Never done     PHQ-2  01/01/2021     Annual Wellness Visit  08/31/2021     FALL RISK ASSESSMENT  08/31/2021     Flu Vaccine (1) 09/01/2021           Insomia  You have symptoms of insomnia and would likely benefit from non-medication approaches to treatment.  Cognitive behavioral treatment (CBT) for insomnia is a very effective technique that involves changing your behaviors and thoughts associated with sleep.  People that are motivated to make changes in their sleep pattern are likely to benefit from internet based cognitive behavioral treatment for insomnia.  Internet CBT programs include but are not limited to www.[employer] or www.[employer].ENDOGENX.  There is a fee for using these programs that is similar to actually seeing an insomnia expert.  By entering the code  [place]  it will allow us to know if our patients find these services useful.      Online Programs     www.[employer] (pronounced shut eye). There is a fee for this program. Enter the code  [employer] [place]  if you decide to enroll in this program.      www.[employer]IO.com (pronounced sleep ee oh). There is a fee for this program. Enter the code  [employer] [place]  if you decide to enroll in this program.       Patient Education   Tips for Sleep Hygiene  \"Sleep hygiene\" means having good sleep habits.Follow these tips to sleep better at night:     Get on a schedule. Go to bed and get up at about the same " "time every day.    Listen to your body. Only try to sleep when you actually feel tired or sleepy.    Be patient. If you haven't been able to get to sleep after about 30 minutes or more, get up and do something calming or boring until you feel sleepy. Then return to bed and try again.    Don't have caffeine (coffee, tea, cola drinks, chocolate and some medicines), alcohol or nicotine (cigarettes). These can make it harder for you to fall asleep and stay asleep.    Use your bed for sleeping only. That means no TV, computer or homework in bed, especially during the evening and before bedtime.    Don't nap during the day. If you must nap, make sure it is for less than 20 minutes.    Create sleep rituals that remind your body it is time to sleep. Examples include breathing exercises, stretching or reading a book.    Avoid all electronic media (smart phone, computer, tablet) within 2 hours of bed time. The \"blue light\" in these devices activates the part of the brain that keeps you awake.    Dim the lights at night.    Get early morning sources of light (walk in the sunshine) to help set sleep patterns at night.    Try a bath or shower before bed. Having a warm bath 1 to 2 hours before bedtime can help you feel sleepy. Hot baths can make you alert, so be mindful of the temperature.    Don't watch the clock. Checking the clock during the night can wake you up. It can also lead to negative thoughts such as, \"I will never fall asleep,\" which can increase anxiety and sleeplessness.    Use a sleep diary. Track your sleep schedule to know your sleep patterns and to see where you can improve.    Get regular exercise every day. Try not to do heavy exercise in the 4 hours before bedtime.    Eat a healthy, balanced diet.    Try eating a light, healthy snack before bed, but avoid eating a heavy meal.    Create the right sleeping area. A cool, dark, quiet room is best. If needed, try earplugs, fans and blackout curtains.    Keep your " daytime routine the same even if you have a bad night sleep. Avoiding activities the next day can make it harder to sleep.  For informational purposes only. Not to replace the advice of your health care provider.   Copyright   2013 [employer]. All rights reserved. [employer] 504828 - 01/16.

## 2021-10-17 ASSESSMENT — ENCOUNTER SYMPTOMS
DYSURIA: 0
HEADACHES: 0
PARESTHESIAS: 0
DIARRHEA: 0
SHORTNESS OF BREATH: 0
PALPITATIONS: 0
JOINT SWELLING: 0
BREAST MASS: 0
FREQUENCY: 0
CHILLS: 0
FEVER: 0
ABDOMINAL PAIN: 0
MYALGIAS: 1
NERVOUS/ANXIOUS: 0
HEMATURIA: 0
HEARTBURN: 0
EYE PAIN: 0
COUGH: 0
NAUSEA: 0
ARTHRALGIAS: 1
CONSTIPATION: 0
SORE THROAT: 0
DIZZINESS: 0
HEMATOCHEZIA: 0
WEAKNESS: 0

## 2021-10-17 ASSESSMENT — ACTIVITIES OF DAILY LIVING (ADL): CURRENT_FUNCTION: NO ASSISTANCE NEEDED

## 2021-10-20 ENCOUNTER — OFFICE VISIT (OUTPATIENT)
Dept: FAMILY MEDICINE | Facility: CLINIC | Age: 66
End: 2021-10-20
Payer: COMMERCIAL

## 2021-10-20 VITALS
HEIGHT: 64 IN | WEIGHT: 129.6 LBS | OXYGEN SATURATION: 99 % | DIASTOLIC BLOOD PRESSURE: 84 MMHG | BODY MASS INDEX: 22.13 KG/M2 | TEMPERATURE: 97.7 F | SYSTOLIC BLOOD PRESSURE: 132 MMHG | HEART RATE: 65 BPM | RESPIRATION RATE: 18 BRPM

## 2021-10-20 DIAGNOSIS — E28.39 ESTROGEN DEFICIENCY: ICD-10-CM

## 2021-10-20 DIAGNOSIS — Z00.00 ENCOUNTER FOR MEDICARE ANNUAL WELLNESS EXAM: Primary | ICD-10-CM

## 2021-10-20 DIAGNOSIS — G47.00 INSOMNIA, UNSPECIFIED TYPE: ICD-10-CM

## 2021-10-20 DIAGNOSIS — M85.80 OSTEOPENIA, UNSPECIFIED LOCATION: ICD-10-CM

## 2021-10-20 DIAGNOSIS — J30.2 SEASONAL ALLERGIC RHINITIS, UNSPECIFIED TRIGGER: ICD-10-CM

## 2021-10-20 PROCEDURE — G0439 PPPS, SUBSEQ VISIT: HCPCS | Performed by: FAMILY MEDICINE

## 2021-10-20 RX ORDER — IPRATROPIUM BROMIDE 21 UG/1
2 SPRAY, METERED NASAL EVERY 12 HOURS
Qty: 30 ML | Refills: 11 | Status: SHIPPED | OUTPATIENT
Start: 2021-10-20 | End: 2023-10-27

## 2021-10-20 ASSESSMENT — ENCOUNTER SYMPTOMS
MYALGIAS: 1
NAUSEA: 0
ABDOMINAL PAIN: 0
DIARRHEA: 0
COUGH: 0
ARTHRALGIAS: 1
SHORTNESS OF BREATH: 0
DYSURIA: 0
EYE PAIN: 0
FEVER: 0
HEARTBURN: 0
HEMATOCHEZIA: 0
PALPITATIONS: 0
SORE THROAT: 0
HEADACHES: 0
JOINT SWELLING: 0
PARESTHESIAS: 0
HEMATURIA: 0
FREQUENCY: 0
NERVOUS/ANXIOUS: 0
CHILLS: 0
DIZZINESS: 0
BREAST MASS: 0
WEAKNESS: 0
CONSTIPATION: 0

## 2021-10-20 ASSESSMENT — ACTIVITIES OF DAILY LIVING (ADL): CURRENT_FUNCTION: NO ASSISTANCE NEEDED

## 2021-10-20 ASSESSMENT — PAIN SCALES - GENERAL: PAINLEVEL: NO PAIN (0)

## 2021-10-20 ASSESSMENT — MIFFLIN-ST. JEOR: SCORE: 1106.83

## 2021-10-21 ENCOUNTER — MYC MEDICAL ADVICE (OUTPATIENT)
Dept: FAMILY MEDICINE | Facility: CLINIC | Age: 66
End: 2021-10-21

## 2021-11-24 ENCOUNTER — TRANSFERRED RECORDS (OUTPATIENT)
Dept: HEALTH INFORMATION MANAGEMENT | Facility: CLINIC | Age: 66
End: 2021-11-24
Payer: COMMERCIAL

## 2021-12-28 ENCOUNTER — MYC MEDICAL ADVICE (OUTPATIENT)
Dept: FAMILY MEDICINE | Facility: CLINIC | Age: 66
End: 2021-12-28
Payer: COMMERCIAL

## 2021-12-29 NOTE — TELEPHONE ENCOUNTER
See [employer] message, advised patient schedule virtual visit to address joint pain.    Mira Wells RN  [facility]

## 2021-12-30 ENCOUNTER — APPOINTMENT (OUTPATIENT)
Dept: URGENT CARE | Facility: CLINIC | Age: 66
End: 2021-12-30
Payer: COMMERCIAL

## 2022-01-03 ENCOUNTER — ANCILLARY PROCEDURE (OUTPATIENT)
Dept: BONE DENSITY | Facility: CLINIC | Age: 67
End: 2022-01-03
Attending: FAMILY MEDICINE
Payer: COMMERCIAL

## 2022-01-03 DIAGNOSIS — E28.39 ESTROGEN DEFICIENCY: ICD-10-CM

## 2022-01-03 PROCEDURE — 77080 DXA BONE DENSITY AXIAL: CPT | Performed by: RADIOLOGY

## 2022-01-05 NOTE — RESULT ENCOUNTER NOTE
Anam Mcdaniels,      I've reviewed your recent dexa scan results.     Osteopenia is diagnosed when there is mild to moderate thinning of the bone (T score -1 to -2.4). Osteoporosis is diagnosed when there is moderate to severe thinning (T score of < -2.5). Low bone density may put you at risk for a fracture.    You have osteopenia of your hip.  You have osteopenia of your spine.    Your bone density numbers are trending downward. You lost an additional 3% density in the spine and about 4% in the hips.   Thankfully, given your overall fracture risk is still low, no other prescription treatment is recommended at this point (although we could talk about the Fosamax again) We will want to monitor the bone density again in two years. If there is further drop, prescription treatment could be considered.       To help prevent further loss of bone, the following is recommended:    Take in adequate amounts of Calcium and Vitamin D. These are the building blocks for bones. Most women will need 1,200 mg of Calcium (through diet and supplements if needed) and 1,000 international units of Vitamin D daily (typically through supplements).    Exercise daily to keep your bones strong. Thirty minutes of moderate walking or other aerobic activity is recommended.    If you have any questions or concerns, please mychart or call.     Sincerely,      Jana Ramirez M.D.

## 2022-01-18 ENCOUNTER — VIRTUAL VISIT (OUTPATIENT)
Dept: FAMILY MEDICINE | Facility: CLINIC | Age: 67
End: 2022-01-18
Payer: COMMERCIAL

## 2022-01-18 DIAGNOSIS — M25.559 LATERAL PAIN OF HIP: Primary | ICD-10-CM

## 2022-01-18 DIAGNOSIS — M85.80 OSTEOPENIA, UNSPECIFIED LOCATION: ICD-10-CM

## 2022-01-18 DIAGNOSIS — K59.09 CHRONIC CONSTIPATION: ICD-10-CM

## 2022-01-18 PROCEDURE — 99214 OFFICE O/P EST MOD 30 MIN: CPT | Mod: 95 | Performed by: FAMILY MEDICINE

## 2022-01-18 RX ORDER — ALENDRONATE SODIUM 35 MG/1
35 TABLET ORAL
Qty: 12 TABLET | Refills: 3 | Status: SHIPPED | OUTPATIENT
Start: 2022-01-18 | End: 2022-10-24

## 2022-01-18 NOTE — PATIENT INSTRUCTIONS
For hip pain:   Schedule with physical therapy  Avoid activities that flare the hip pain.     For osteopenia:  Restart fosamax once weekly. Update me if any side effects.   Bone scan every 2 years  Will plan to treat for 3-5 years.   Schedule lab only visit to check for metabolic causes of bone loss.     Patient Education     Understanding Trochanteric Bursitis    A bursa is a thin, slippery, sac-like film. It contains a small amount of fluid. This structure is found between bones and soft tissues in and around joints. A bursa cushions and protects a joint. It keeps parts of a joint from rubbing against each other. If a bursa becomes inflamed and irritated, it's known as bursitis.   The trochanteric bursa is found on the hip joint. It lies on top of the bump at the top of the thighbone called the greater trochanter. Inflammation of this bursa is called trochanteric bursitis.   How to say it   [id number]  Causes of trochanteric bursitis  Causes may include:    Overuse of the hip during running or other sports, dance, or work    Falling on or irritation to the side of the hip  This condition may occur along with other problems, such as osteoarthritis of the hip or knee, or low back problems. In rare cases, it may occur after hip surgery.   Symptoms of trochanteric bursitis    Pain or aching on the side of the hip. It's often felt as a sharp, intense pain. The pain may travel down the leg.    Swelling, tenderness, or warmth on the side of the hip at the bony bump at the top of the thigh    Treatment for trochanteric bursitis  These may include:    Resting the hip. This allows the bursa to heal.    Prescription or over-the-counter pain medicines. These help reduce inflammation, swelling, and pain. NSAIDs (nonsteroidal anti-inflammatory drugs) are the most common medicines used. Medicines may be prescribed or bought over the counter. They may be given as pills. Or they may be put on the skin as a gel, cream, or  patch.    Cold packs and heat packs. These help reduce pain and swelling.    Stretching and strengthening exercises. These improve flexibility and strength around the hip.    Physical therapy. This includes exercises or other treatments.    Injections of medicine into the bursa.  This may help reduce inflammation and relieve symptoms. The medicine is usually a corticosteroid. This is a strong anti-inflammatory medicine.  Possible complications  If you don t give your hip time to heal, the problem may not go away, may return, or may get worse. Rest and treat your hip as directed.   When to call your healthcare provider  Call your healthcare provider right away if you have any of these:    Fever of 100.4 F (38 C) or higher, or as directed by your provider    Redness, swelling, or warmth that gets worse    Symptoms that don t get better with prescribed medicines, or get worse    New symptoms  Flip last reviewed this educational content on 6/1/2019 2000-2021 [employer]. All rights reserved. This information is not intended as a substitute for professional medical care. Always follow your healthcare professional's instructions.           Patient Education     Iliotibial Band Stretch (Flexibility)     1. Stand next to a chair. Hold onto the chair with your right hand for support. Cross your right leg behind your left leg.  2. Lean your right hip toward the right. Feel the stretch at the outside of your hip.  3. Hold for 30 to 60 seconds. Then relax.  4. Repeat 2 times, or as instructed.  5. Switch sides and repeat.  6. Do this 3 times a day, or as instructed.     Tip: Don t bend forward or twist at the waist.   Flip last reviewed this educational content on 3/1/2020    2290-3659 [employer]. All rights reserved. This information is not intended as a substitute for professional medical care. Always follow your healthcare professional's instructions.

## 2022-01-18 NOTE — PROGRESS NOTES
Enedelia is a 66 year old who is being evaluated via a billable telephone visit.      What phone number would you like to be contacted at? [phone]  How would you like to obtain your AVS? Talia    Assessment & Plan     Lateral pain of hip  Suspect bursitis based on description of pain but hard to fully r/o lumbar radiculopathy. rec icing/nsaids/rest.  f/u with therapy and consider referral to ortho if not improving for possible injection    Osteopenia, unspecified location  T scores are dropping again. Has tolerated fosomax in past but off med now for 8 years.   Patient interested in restart. Reviewed timing of taking, onset, benefits, monitoring and typicall and severe AE of the medication.  Will plan to treat 3-5 years.            Patient Instructions   For hip pain:   Schedule with physical therapy  Avoid activities that flare the hip pain.     For osteopenia:  Restart fosamax once weekly. Update me if any side effects.   Bone scan every 2 years  Will plan to treat for 3-5 years.   Schedule lab only visit to check for metabolic causes of bone loss.     Patient Education     Understanding Trochanteric Bursitis    A bursa is a thin, slippery, sac-like film. It contains a small amount of fluid. This structure is found between bones and soft tissues in and around joints. A bursa cushions and protects a joint. It keeps parts of a joint from rubbing against each other. If a bursa becomes inflamed and irritated, it's known as bursitis.   The trochanteric bursa is found on the hip joint. It lies on top of the bump at the top of the thighbone called the greater trochanter. Inflammation of this bursa is called trochanteric bursitis.   How to say it   [id number]  Causes of trochanteric bursitis  Causes may include:    Overuse of the hip during running or other sports, dance, or work    Falling on or irritation to the side of the hip  This condition may occur along with other problems, such as osteoarthritis of the  hip or knee, or low back problems. In rare cases, it may occur after hip surgery.   Symptoms of trochanteric bursitis    Pain or aching on the side of the hip. It's often felt as a sharp, intense pain. The pain may travel down the leg.    Swelling, tenderness, or warmth on the side of the hip at the bony bump at the top of the thigh    Treatment for trochanteric bursitis  These may include:    Resting the hip. This allows the bursa to heal.    Prescription or over-the-counter pain medicines. These help reduce inflammation, swelling, and pain. NSAIDs (nonsteroidal anti-inflammatory drugs) are the most common medicines used. Medicines may be prescribed or bought over the counter. They may be given as pills. Or they may be put on the skin as a gel, cream, or patch.    Cold packs and heat packs. These help reduce pain and swelling.    Stretching and strengthening exercises. These improve flexibility and strength around the hip.    Physical therapy. This includes exercises or other treatments.    Injections of medicine into the bursa.  This may help reduce inflammation and relieve symptoms. The medicine is usually a corticosteroid. This is a strong anti-inflammatory medicine.  Possible complications  If you don t give your hip time to heal, the problem may not go away, may return, or may get worse. Rest and treat your hip as directed.   When to call your healthcare provider  Call your healthcare provider right away if you have any of these:    Fever of 100.4 F (38 C) or higher, or as directed by your provider    Redness, swelling, or warmth that gets worse    Symptoms that don t get better with prescribed medicines, or get worse    New symptoms  Flip last reviewed this educational content on 6/1/2019 2000-2021 [employer]. All rights reserved. This information is not intended as a substitute for professional medical care. Always follow your healthcare professional's instructions.           Patient  "Education     Iliotibial Band Stretch (Flexibility)     1. Stand next to a chair. Hold onto the chair with your right hand for support. Cross your right leg behind your left leg.  2. Lean your right hip toward the right. Feel the stretch at the outside of your hip.  3. Hold for 30 to 60 seconds. Then relax.  4. Repeat 2 times, or as instructed.  5. Switch sides and repeat.  6. Do this 3 times a day, or as instructed.     Tip: Don t bend forward or twist at the waist.   [employer] last reviewed this educational content on 3/1/2020    9350-2754 [employer]. All rights reserved. This information is not intended as a substitute for professional medical care. Always follow your healthcare professional's instructions.               Return in about 10 months (around 11/18/2022) for Routine preventive.    Jana Ramirez MD  [facility] RAYEN Mcdaniels is a 66 year old who presents for the following health issues     HPI     Was noting hip pain when appt was set up.   So much pain was having difficulty to walk  Getting better.   Both hips, \"socket area\". Side of the hip. Radiates to front of the thighs.  Naproxen prn which helps.   Hurts to lay on the side of her hip.   Hurt to sit on sofa with legs curled behind her.   No new numbness legs. Partially numb foot from her lumbar radiculopathy at baseline.   Lower back has been good with no pain.   No weakness in legs.     Has been very active.   Walks a lot. Yoga, circuit clasees on- line.     Yoga seems to help, circuit training makes it worse.     Getting adequate ca/vit D/            Objective           Vitals:  No vitals were obtained today due to virtual visit.    Physical Exam   healthy, alert and no distress  PSYCH: Alert and oriented times 3; coherent speech, normal   rate and volume, able to articulate logical thoughts, able   to abstract reason, no tangential thoughts, no hallucinations   or delusions  Her affect " is normal and pleasant  RESP: No cough, no audible wheezing, able to talk in full sentences  Remainder of exam unable to be completed due to telephone visits    reviewed recent dexa results together.             Phone call duration: 28 minutes

## 2022-01-19 ENCOUNTER — THERAPY VISIT (OUTPATIENT)
Dept: PHYSICAL THERAPY | Facility: CLINIC | Age: 67
End: 2022-01-19
Attending: FAMILY MEDICINE
Payer: COMMERCIAL

## 2022-01-19 DIAGNOSIS — M25.559 LATERAL PAIN OF HIP: ICD-10-CM

## 2022-01-19 DIAGNOSIS — M25.551 BILATERAL HIP PAIN: ICD-10-CM

## 2022-01-19 DIAGNOSIS — G89.29 CHRONIC RIGHT SHOULDER PAIN: ICD-10-CM

## 2022-01-19 DIAGNOSIS — M25.552 BILATERAL HIP PAIN: ICD-10-CM

## 2022-01-19 DIAGNOSIS — M25.511 CHRONIC RIGHT SHOULDER PAIN: ICD-10-CM

## 2022-01-19 PROCEDURE — 97110 THERAPEUTIC EXERCISES: CPT | Mod: GP | Performed by: PHYSICAL THERAPIST

## 2022-01-19 PROCEDURE — 97161 PT EVAL LOW COMPLEX 20 MIN: CPT | Mod: GP | Performed by: PHYSICAL THERAPIST

## 2022-01-19 ASSESSMENT — ACTIVITIES OF DAILY LIVING (ADL)
STEPPING_UP_AND_DOWN_CURBS: NO DIFFICULTY AT ALL
WALKING_INITIALLY: MODERATE DIFFICULTY
TWISTING/PIVOTING_ON_INVOLVED_LEG: EXTREME DIFFICULTY
HOS_ADL_SCORE(%): 57.81
LIGHT_TO_MODERATE_WORK: EXTREME DIFFICULTY
PUTTING_ON_SOCKS_AND_SHOES: SLIGHT DIFFICULTY
GETTING_INTO_AND_OUT_OF_AN_AVERAGE_CAR: SLIGHT DIFFICULTY
HOS_ADL_HIGHEST_POTENTIAL_SCORE: 64
RECREATIONAL_ACTIVITIES: MODERATE DIFFICULTY
SITTING_FOR_15_MINUTES: MODERATE DIFFICULTY
WALKING_DOWN_STEEP_HILLS: SLIGHT DIFFICULTY
GOING_DOWN_1_FLIGHT_OF_STAIRS: NO DIFFICULTY AT ALL
HOS_ADL_COUNT: 16
WALKING_APPROXIMATELY_10_MINUTES: MODERATE DIFFICULTY
WALKING_UP_STEEP_HILLS: MODERATE DIFFICULTY
HOW_WOULD_YOU_RATE_YOUR_CURRENT_LEVEL_OF_FUNCTION_DURING_YOUR_USUAL_ACTIVITIES_OF_DAILY_LIVING_FROM_0_TO_100_WITH_100_BEING_YOUR_LEVEL_OF_FUNCTION_PRIOR_TO_YOUR_HIP_PROBLEM_AND_0_BEING_THE_INABILITY_TO_PERFORM_ANY_OF_YOUR_USUAL_DAILY_ACTIVITIES?: 40
STANDING_FOR_15_MINUTES: MODERATE DIFFICULTY
HOS_ADL_ITEM_SCORE_TOTAL: 37
DEEP_SQUATTING: NO DIFFICULTY AT ALL
GOING_UP_1_FLIGHT_OF_STAIRS: SLIGHT DIFFICULTY
HEAVY_WORK: EXTREME DIFFICULTY
WALKING_15_MINUTES_OR_GREATER: EXTREME DIFFICULTY
ROLLING_OVER_IN_BED: MODERATE DIFFICULTY

## 2022-01-19 NOTE — PROGRESS NOTES
Physical Therapy Initial Evaluation  Subjective:  Patient presents to outpatient PT with 2 month h/o bilateral hip pain, insidious onset.  Patient reports she had been walking regularly until fall.  Took a short break from walking, then when resumed in November, felt significant pain in both hips (lateral).  Since then, exercise classes seem to aggravate symptoms (particularly with side to side movements, lunges).  Yoga practice does seem to alleviate both hips and right shoulder symptoms.  Right shoulder is aggravated with overhead reaching.  This pain has been more chronic, onset this past summer when patient was working at [employer] (lifting heavy objects overhead).  PMH significant for h/o low back pain with radiation into right leg.  PMH signficant for right shoulder surgery in 1981, to correct instability (patient was dislocating).  Patient reports no issues with shoulder until recent onset.  Patient's recent Dexa scan reveals osteopenia in hips and lumbar spine.    The history is provided by the patient. No  was used.   Therapist Generated HPI Evaluation         Type of problem:  Bilateral hips.    This is a new condition.  Condition occurred with:  Insidious onset.  Where condition occurred: for unknown reasons.  Patient reports pain:  Greater trochanter.  Pain is described as aching and is intermittent.  Pain radiates to:  Hip and thigh. Pain is worse during the night and the same all the time.  Since onset symptoms are unchanged.  Symptoms are exacerbated by ascending stairs, standing, sitting, walking and lying on extremity  and relieved by NSAID's.      Barriers include:  None as reported by patient.    Patient Health History  Елена Mix being seen for hip and shoulder pain.     Problem began: 11/19/2021.   Problem occurred: unknown   Pain is reported as 6/10 on pain scale.    Pertinent medical history includes: numbness/tingling.   Red flags:  Pain at rest/night.  Medical  allergies: none.   Surgeries include:  Orthopedic surgery. Other surgery history details: right shoulder in 1981 (to correct dislocation).    Current medications:  None.    Current occupation is retired.   Primary job tasks include:  Lifting/carrying, pushing/pulling and repetitive tasks.                                    Objective:  System         Lumbar/SI Evaluation    Lumbar Myotomes:      L2-4 (Quads):  Left:  5    Right:  5  L4 (Ankle DF):  Left:  5    Right:  5            Neural Tension/Mobility:      Left side:SLR or Slump  negative.     Right side:   Slump or SLR  negative.                                             Hip Evaluation  HIP AROM:    Flexion: Left: min restriction, painful    Right:  WNL    Extension: Left: min restriction, painful    Right:  WNL      Internal Rotation: Left: mod restriction, painful    Right: WNL, slight pain  External Rotation: Left: min restriction, painful    Right: WNL        Hip Strength:    Flexion:   Left: 4/5   Pain:  Right: 4+/5   Pain:                    Extension:  Left: 3/5  Pain:Right: 4/5    Pain:    Abduction:  Left: 4/5     Pain:Right: 4+/5    Pain:        Knee Flexion:  Left: 5/5   Pain:Right: 5/5   Pain:  Knee Extension:  Left: 5/5   Pain:Right: 5/5    Pain:        Hip Special Testing:    Left hip positive for the following special tests:  David and Fadir/Labrum   Right hip positive for the following special tests:  FabreRight hip negative for the following special tests:  Fadir/Labrum    Hip Palpation:  Normal         Functional Testing:            Proprioception:    Stork balance test:   Left:    Painful  Right:  Not painful  % of Uninvolved:                  Nate Lumbar Evaluation      Movement Loss:  Flexion (Flex): nil  Extension (EXT): min  Side Glide R (SG R): min and pain  Side [place] L (SG L): pain and min  Test Movements:    EIS: During: no effect  After: no effect  Mechanical Response: no effect  Repeat EIS: During: no effect  After: no effect   Mechanical Response: IncROM                                                     ROS    Assessment/Plan:    Patient is a 66 year old female with both sides hip complaints.    Patient has the following significant findings with corresponding treatment plan.                Diagnosis 1:  Bilateral hip  Pain -  manual therapy, self management, education, directional preference exercise and home program  Decreased ROM/flexibility - manual therapy and therapeutic exercise  Decreased joint mobility - manual therapy and therapeutic exercise  Decreased strength - therapeutic exercise and therapeutic activities  Diagnosis 2:  Right shoulder   Pain -  manual therapy, self management, education, directional preference exercise and home program    Therapy Evaluation Codes:   1) History comprised of:   Personal factors that impact the plan of care:      None.    Comorbidity factors that impact the plan of care are:      Numbness/tingling.     Medications impacting care: Anti-inflammatory.  2) Examination of Body Systems comprised of:   Body structures and functions that impact the plan of care:      Lumbar spine.   Activity limitations that impact the plan of care are:      Sitting, Stairs and Walking.  3) Clinical presentation characteristics are:   Stable/Uncomplicated.  4) Decision-Making    Low complexity using standardized patient assessment instrument and/or measureable assessment of functional outcome.  Cumulative Therapy Evaluation is: Low complexity.    Previous and current functional limitations:  (See Goal Flow Sheet for this information)    Short term and Long term goals: (See Goal Flow Sheet for this information)     Communication ability:  Patient appears to be able to clearly communicate and understand verbal and written communication and follow directions correctly.  Treatment Explanation - The following has been discussed with the patient:   RX ordered/plan of care  Anticipated outcomes  Possible risks and side  effects  This patient would benefit from PT intervention to resume normal activities.   Rehab potential is good.    Frequency:  1 X week, once daily  Duration:  for 8 weeks  Discharge Plan:  Achieve all LTG.  Independent in home treatment program.  Reach maximal therapeutic benefit.    Please refer to the daily flowsheet for treatment today, total treatment time and time spent performing 1:1 timed codes.

## 2022-01-19 NOTE — PROGRESS NOTES
[facility]    OUTPATIENT Physical Therapy ORTHOPEDIC EVALUATION  PLAN OF TREATMENT FOR OUTPATIENT REHABILITATION  (COMPLETE FOR INITIAL CLAIMS ONLY)  Patient's Last Name, First Name, M.I.  YOB: 1955  Елена Mix    Provider s Name:  [facility]   Medical Record No.  [id number]   Start of Care Date:  01/19/22   Onset Date:   11/19/21   Type:     _X__PT   ___OT Medical Diagnosis:    Encounter Diagnoses   Name Primary?     Lateral pain of hip      Bilateral hip pain      Chronic right shoulder pain         Treatment Diagnosis:  bilateral hip pain        Goals:     01/19/22 0500   Body Part   Goals listed below are for bilateral hip pain and shoulder pain   Goal #1   Goal #1 stairs   Previous Functional Level No restrictions;Ascend stairs;in a normal reciprocal pattern   Performance Level no pain   Current Functional Level Ascend stairs;in a normal reciprocal pattern   Performance Level pain 6/10   STG Target Performance Ascend stairs;in a normal reciprocal pattern   Performance Level pain 3/10 or less   Rationale to reach upper level of home safely   Due Date 02/16/22   LTG Target Performance Ascend stairs;in a normal reciprocal pattern   Performance Level no pain   Rationale to reach upper level of home safely   Due Date 03/16/22   Goal #2   Goal #2 reaching   Previous Functional Level No restrictions   Performance level no pain   Current Functional Level Can reach;overhead   Performance level pain 9/10   STG Target Performance overhead   Performance level 5#, no pain   Rationale for independent personal hygiene   Due date 02/16/22   LTG Target Performance overhead   Performance Level 10#, no pain   Rationale for independent personal hygiene   Due date 03/16/22       Therapy Frequency:  1x/week  Predicted Duration of Therapy Intervention:  8 weeks    Nkechi  Gera, PT                 I CERTIFY THE NEED FOR THESE SERVICES FURNISHED UNDER        THIS PLAN OF TREATMENT AND WHILE UNDER MY CARE     (Physician attestation of this document indicates review and certification of the therapy plan).                       Certification Date From:  01/19/22   Certification Date To:  04/18/22    Referring Provider:  Jana Anders*    Initial Assessment        See Epic Evaluation SOC Date: 01/19/22

## 2022-01-26 ENCOUNTER — THERAPY VISIT (OUTPATIENT)
Dept: PHYSICAL THERAPY | Facility: CLINIC | Age: 67
End: 2022-01-26
Payer: COMMERCIAL

## 2022-01-26 DIAGNOSIS — G89.29 CHRONIC RIGHT SHOULDER PAIN: ICD-10-CM

## 2022-01-26 DIAGNOSIS — M25.511 CHRONIC RIGHT SHOULDER PAIN: ICD-10-CM

## 2022-01-26 DIAGNOSIS — M25.552 BILATERAL HIP PAIN: ICD-10-CM

## 2022-01-26 DIAGNOSIS — M25.551 BILATERAL HIP PAIN: ICD-10-CM

## 2022-01-26 PROCEDURE — 97110 THERAPEUTIC EXERCISES: CPT | Mod: GP | Performed by: PHYSICAL THERAPIST

## 2022-01-26 NOTE — PROGRESS NOTES
Subjective:  HPI  Physical Exam                    Objective:  System                   Shoulder Evaluation:  ROM:  AROM:    Flexion:  Left:  151    Right:  162  Extension: Left: 71Right: 58  Abduction:  Left: 172   Right:  162    Internal Rotation:  Left:  T6    Right:  T3                                                                   Nate Cervical Evaluation      Movement Loss:  Protrusion (PRO): nil  Flexion (Flex): nil  Retraction (RET): nil  Extension (EXT): min  Lateral Flexion Right (LF R): min  Lateral Flexion Left (LF L): min  Rotation Right (ROT R): mod  Rotation Left (ROT L): min                               Nate Thoracic Evalution:    Movement Loss:    Extension (EXT): min  Rotation Lt (ROT L): min and pain  Rotation Rt (ROT R): mod                            ROS    Assessment/Plan:    SUBJECTIVE  Subjective changes as noted by pt:  Patient reports no change overall in hip or shoulder symptoms.  Was able to complete low back and hip stretches as prescribed, did not notice any change.     Current pain level: No change     Changes in function:  Yes (See Goal flowsheet attached for changes in current functional level)     Adverse reaction to treatment or activity:  None    OBJECTIVE  Changes in objective findings:  Yes, increase in right shoulder AROM s/p repeated right shoulder extension exercise.        ASSESSMENT  Елена continues to require intervention to meet STG and LTG's: PT  Patient is progressing as expected.  Response to therapy has shown an improvement in  ROM   Progress made towards STG/LTG?  Yes (See Goal flowsheet attached for updates on achievement of STG and LTG)    PLAN  Current treatment program is being advanced to more complex exercises.    PTA/ATC plan:  N/A    Please refer to the daily flowsheet for treatment today, total treatment time and time spent performing 1:1 timed codes.

## 2022-02-02 ENCOUNTER — THERAPY VISIT (OUTPATIENT)
Dept: PHYSICAL THERAPY | Facility: CLINIC | Age: 67
End: 2022-02-02
Payer: COMMERCIAL

## 2022-02-02 DIAGNOSIS — M25.552 BILATERAL HIP PAIN: ICD-10-CM

## 2022-02-02 DIAGNOSIS — M25.511 CHRONIC RIGHT SHOULDER PAIN: ICD-10-CM

## 2022-02-02 DIAGNOSIS — G89.29 CHRONIC RIGHT SHOULDER PAIN: ICD-10-CM

## 2022-02-02 DIAGNOSIS — M25.551 BILATERAL HIP PAIN: ICD-10-CM

## 2022-02-02 PROCEDURE — 97112 NEUROMUSCULAR REEDUCATION: CPT | Mod: GP | Performed by: PHYSICAL THERAPIST

## 2022-02-02 PROCEDURE — 97110 THERAPEUTIC EXERCISES: CPT | Mod: GP | Performed by: PHYSICAL THERAPIST

## 2022-02-02 NOTE — PROGRESS NOTES
Subjective:  HPI  Physical Exam                    Objective:  System                   Shoulder Evaluation:  ROM:  AROM:    Flexion:  Right:  156  Extension: Right: 68  Abduction:  Right:  172                                                            Hip Evaluation  Hip PROM:    Flexion: Left: mild pain, resolved s/p quadruped rocking   Right: WNL        Internal Rotation: Left: min-mod restriction, mild pain, improved s/p quadruped rocking    Right: WNL  External Rotation: Left: WNL    Right: WNL                               General     ROS    Assessment/Plan:    SUBJECTIVE  Subjective changes as noted by pt:  Patient reports significant improvement in right shoulder symptoms, and increased ROM.  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  Bilateral hips are improving, feel about 60% improved.     Current pain level: 1/10 Current Pain level: (P) 1/10   Changes in function:  Yes (See Goal flowsheet attached for changes in current functional level)     Adverse reaction to treatment or activity:  None    OBJECTIVE  Changes in objective findings:  Yes, increased right shoulder AROM, increased left hip PROM with decreased pain.        ASSESSMENT  Елена continues to require intervention to meet STG and LTG's: PT  Patient's symptoms are resolving.  Response to therapy has shown an improvement in  pain level and ROM   Progress made towards STG/LTG?  Yes (See Goal flowsheet attached for updates on achievement of STG and LTG)    PLAN  Current treatment program is being advanced to more complex exercises.    PTA/ATC plan:  N/A    Please refer to the daily flowsheet for treatment today, total treatment time and time spent performing 1:1 timed codes.         Home

## 2022-02-07 ENCOUNTER — LAB (OUTPATIENT)
Dept: LAB | Facility: CLINIC | Age: 67
End: 2022-02-07
Payer: COMMERCIAL

## 2022-02-07 DIAGNOSIS — K59.09 CHRONIC CONSTIPATION: ICD-10-CM

## 2022-02-07 DIAGNOSIS — M85.80 OSTEOPENIA, UNSPECIFIED LOCATION: ICD-10-CM

## 2022-02-07 LAB
ANION GAP SERPL CALCULATED.3IONS-SCNC: 5 MMOL/L (ref 3–14)
BUN SERPL-MCNC: 26 MG/DL (ref 7–30)
CALCIUM SERPL-MCNC: 10 MG/DL (ref 8.5–10.1)
CHLORIDE BLD-SCNC: 103 MMOL/L (ref 94–109)
CO2 SERPL-SCNC: 28 MMOL/L (ref 20–32)
CREAT SERPL-MCNC: 0.83 MG/DL (ref 0.52–1.04)
DEPRECATED CALCIDIOL+CALCIFEROL SERPL-MC: 108 UG/L (ref 20–75)
GFR SERPL CREATININE-BSD FRML MDRD: 76 ML/MIN/1.73M2
GLUCOSE BLD-MCNC: 100 MG/DL (ref 70–99)
POTASSIUM BLD-SCNC: 4.9 MMOL/L (ref 3.4–5.3)
PTH-INTACT SERPL-MCNC: 36 PG/ML (ref 18–80)
SODIUM SERPL-SCNC: 136 MMOL/L (ref 133–144)
TSH SERPL DL<=0.005 MIU/L-ACNC: 2.87 MU/L (ref 0.4–4)

## 2022-02-07 PROCEDURE — 83970 ASSAY OF PARATHORMONE: CPT

## 2022-02-07 PROCEDURE — 80048 BASIC METABOLIC PNL TOTAL CA: CPT

## 2022-02-07 PROCEDURE — 82306 VITAMIN D 25 HYDROXY: CPT

## 2022-02-07 PROCEDURE — 36415 COLL VENOUS BLD VENIPUNCTURE: CPT

## 2022-02-07 PROCEDURE — 84443 ASSAY THYROID STIM HORMONE: CPT

## 2022-02-08 NOTE — RESULT ENCOUNTER NOTE
Enedelia,  It was a pleasure to talk with you recently.  The parathyroid hormone, thyroid test and kidney and electrolyte panel were normal.   Your vitamin D level was quite high.  Vitamin D can be dangerous if too high so I would recommend stopping all vitamin D supplements right now.   After one month of being off the vitamin D you could restart a low dose supplement but I would suggest not going over 1,000 international unit(s) per day (25 mcg daily).   Please MyChart or call if you have any concerns or questions.   Sincerely,  Jana Ramirez MD

## 2022-02-09 ENCOUNTER — THERAPY VISIT (OUTPATIENT)
Dept: PHYSICAL THERAPY | Facility: CLINIC | Age: 67
End: 2022-02-09
Payer: COMMERCIAL

## 2022-02-09 DIAGNOSIS — M25.551 BILATERAL HIP PAIN: ICD-10-CM

## 2022-02-09 DIAGNOSIS — M25.552 BILATERAL HIP PAIN: ICD-10-CM

## 2022-02-09 DIAGNOSIS — M25.511 CHRONIC RIGHT SHOULDER PAIN: ICD-10-CM

## 2022-02-09 DIAGNOSIS — G89.29 CHRONIC RIGHT SHOULDER PAIN: ICD-10-CM

## 2022-02-09 PROCEDURE — 97112 NEUROMUSCULAR REEDUCATION: CPT | Mod: GP | Performed by: PHYSICAL THERAPIST

## 2022-02-09 PROCEDURE — 97110 THERAPEUTIC EXERCISES: CPT | Mod: GP | Performed by: PHYSICAL THERAPIST

## 2022-02-09 NOTE — PROGRESS NOTES
Subjective:  HPI  Physical Exam                    Objective:  System    Physical Exam    General     ROS    Assessment/Plan:    SUBJECTIVE  Subjective changes as noted by pt:  Patient reports continued improvement in both hips and right shoulder.  Discontinued vitamin D, and increased water intake (recommended after MD visit), and feels better after starting this.     Current pain level: 0/10 Current Pain level: 0/10   Changes in function:  Yes (See Goal flowsheet attached for changes in current functional level)     Adverse reaction to treatment or activity:  None    OBJECTIVE  Changes in objective findings:  Yes, increased left hip PROM without pain.        ASSESSMENT  Елена continues to require intervention to meet STG and LTG's: PT  Patient's symptoms are resolving.  Response to therapy has shown an improvement in  pain level and ROM   Progress made towards STG/LTG?  Yes (See Goal flowsheet attached for updates on achievement of STG and LTG)    PLAN  Current treatment program is being advanced to more complex exercises.    PTA/ATC plan:  N/A    Please refer to the daily flowsheet for treatment today, total treatment time and time spent performing 1:1 timed codes.

## 2022-02-22 ENCOUNTER — VIRTUAL VISIT (OUTPATIENT)
Dept: FAMILY MEDICINE | Facility: CLINIC | Age: 67
End: 2022-02-22
Payer: COMMERCIAL

## 2022-02-22 DIAGNOSIS — R79.89 HIGH SERUM VITAMIN D: ICD-10-CM

## 2022-02-22 DIAGNOSIS — M85.80 OSTEOPENIA, UNSPECIFIED LOCATION: ICD-10-CM

## 2022-02-22 DIAGNOSIS — M25.519 SHOULDER PAIN, UNSPECIFIED CHRONICITY, UNSPECIFIED LATERALITY: ICD-10-CM

## 2022-02-22 DIAGNOSIS — R89.9 ABNORMAL LABORATORY TEST: ICD-10-CM

## 2022-02-22 DIAGNOSIS — M25.559 LATERAL PAIN OF HIP: Primary | ICD-10-CM

## 2022-02-22 PROCEDURE — 99214 OFFICE O/P EST MOD 30 MIN: CPT | Mod: 95 | Performed by: FAMILY MEDICINE

## 2022-02-22 NOTE — PROGRESS NOTES
Enedelia is a 66 year old who is being evaluated via a billable telephone visit.      What phone number would you like to be contacted at? [phone]  How would you like to obtain your AVS? MyChart    Assessment & Plan     Lateral pain of hip  Is improving with therapy. Reports therapist doesn't think pain is bursitis. Will get labs to check for pmr but reviewed this typically would not improve with therapy. F/u with ortho if ongoing pain    Shoulder pain, unspecified chronicity, unspecified laterality  Right sided makes autoimmune less likely. In therapy. F/u with ortho if persisting sxs    HIGH VIT D  Will recheck vit D level. Off her high dose supplement.     Osteopenia  Tolerating fosamax      Return in about 8 months (around 10/22/2022) for Routine preventive.    Jana Ramirez MD  [facility]   Enedelia is a 66 year old who presents for the following health issues     History of Present Illness     Reason for visit:  Discuss lab tests results particularly Vitamin D and my joint pain. Im not comfortable restarting this supplement considering the high levels in my blood.  Symptom onset:  More than a month  Symptoms include:  Hip, shoulder joint pain  Symptom intensity:  Moderate  Symptom progression:  Improving  Had these symptoms before:  No  What makes it worse:  The pain was worse while on Vitamin D3. I was taking 5000 IU along with my multivitamin that contains 25 mcg.  Now just taking the multivitamin, no additional supplement.  What makes it better:  PT, yoga and increased water intake    She eats 0-1 servings of fruits and vegetables daily.She consumes 0 sweetened beverage(s) daily.She exercises with enough effort to increase her heart rate 30 to 60 minutes per day.  She exercises with enough effort to increase her heart rate 4 days per week.   She is taking medications regularly.     Is going through PHYSICAL THERAPY and getting better.  Pain is hip  (bilateral) and shoulder (right- same side as surgery).   Pain is currently 50% better.  If sit or stand too long will have pain getting out of chairs.   Pain goes away with naproxen for a few days and then comes back.       Brother has RA, sister has OA.  No oral sores  No new rashes  No swelling of joints.   Vision-has cataracts but due for eye exam.     Started fosamax last visit and tolerating it    Stopped vit d 5000 international unit(s) supplement after labs below. Stopped mvi for a bit but now back on it.      In general, yoga/exercise has made her pain better.       Recent Results (from the past 720 hour(s))   Basic metabolic panel  (Ca, Cl, CO2, Creat, Gluc, K, Na, BUN)    Collection Time: 02/07/22 11:02 AM   Result Value Ref Range    Sodium 136 133 - 144 mmol/L    Potassium 4.9 3.4 - 5.3 mmol/L    Chloride 103 94 - 109 mmol/L    Carbon Dioxide (CO2) 28 20 - 32 mmol/L    Anion Gap 5 3 - 14 mmol/L    Urea Nitrogen 26 7 - 30 mg/dL    Creatinine 0.83 0.52 - 1.04 mg/dL    Calcium 10.0 8.5 - 10.1 mg/dL    Glucose 100 (H) 70 - 99 mg/dL    GFR Estimate 76 >60 mL/min/1.73m2   Vitamin D Deficiency    Collection Time: 02/07/22 11:02 AM   Result Value Ref Range    Vitamin D, Total (25-Hydroxy) 108 (H) 20 - 75 ug/L   Parathyroid Hormone Intact    Collection Time: 02/07/22 11:02 AM   Result Value Ref Range    Parathyroid Hormone Intact 36 18 - 80 pg/mL   TSH with free T4 reflex    Collection Time: 02/07/22 11:02 AM   Result Value Ref Range    TSH 2.87 0.40 - 4.00 mU/L             Objective           Vitals:  No vitals were obtained today due to virtual visit.    Physical Exam   healthy, alert and no distress  PSYCH: Alert and oriented times 3; coherent speech, normal   rate and volume, able to articulate logical thoughts, able   to abstract reason, no tangential thoughts, no hallucinations   or delusions  Her affect is normal and pleasant  RESP: No cough, no audible wheezing, able to talk in full sentences  Remainder  of exam unable to be completed due to telephone visits            Phone call duration: 16 minutes

## 2022-02-23 ENCOUNTER — THERAPY VISIT (OUTPATIENT)
Dept: PHYSICAL THERAPY | Facility: CLINIC | Age: 67
End: 2022-02-23
Payer: COMMERCIAL

## 2022-02-23 DIAGNOSIS — G89.29 CHRONIC RIGHT SHOULDER PAIN: ICD-10-CM

## 2022-02-23 DIAGNOSIS — M25.552 BILATERAL HIP PAIN: ICD-10-CM

## 2022-02-23 DIAGNOSIS — M25.551 BILATERAL HIP PAIN: ICD-10-CM

## 2022-02-23 DIAGNOSIS — M25.511 CHRONIC RIGHT SHOULDER PAIN: ICD-10-CM

## 2022-02-23 PROCEDURE — 97110 THERAPEUTIC EXERCISES: CPT | Mod: GP | Performed by: PHYSICAL THERAPIST

## 2022-02-23 PROCEDURE — 97140 MANUAL THERAPY 1/> REGIONS: CPT | Mod: GP | Performed by: PHYSICAL THERAPIST

## 2022-02-24 ENCOUNTER — LAB (OUTPATIENT)
Dept: LAB | Facility: CLINIC | Age: 67
End: 2022-02-24
Payer: COMMERCIAL

## 2022-02-24 DIAGNOSIS — M25.519 SHOULDER PAIN, UNSPECIFIED CHRONICITY, UNSPECIFIED LATERALITY: ICD-10-CM

## 2022-02-24 DIAGNOSIS — M25.559 LATERAL PAIN OF HIP: ICD-10-CM

## 2022-02-24 DIAGNOSIS — R79.89 HIGH SERUM VITAMIN D: ICD-10-CM

## 2022-02-24 DIAGNOSIS — M85.80 OSTEOPENIA, UNSPECIFIED LOCATION: ICD-10-CM

## 2022-02-24 LAB
CRP SERPL-MCNC: <2.9 MG/L (ref 0–8)
ERYTHROCYTE [SEDIMENTATION RATE] IN BLOOD BY WESTERGREN METHOD: 9 MM/HR (ref 0–30)

## 2022-02-24 PROCEDURE — 86038 ANTINUCLEAR ANTIBODIES: CPT

## 2022-02-24 PROCEDURE — 36415 COLL VENOUS BLD VENIPUNCTURE: CPT

## 2022-02-24 PROCEDURE — 86140 C-REACTIVE PROTEIN: CPT

## 2022-02-24 PROCEDURE — 82306 VITAMIN D 25 HYDROXY: CPT

## 2022-02-24 PROCEDURE — 85652 RBC SED RATE AUTOMATED: CPT

## 2022-02-25 LAB
ANA SER QL IF: NEGATIVE
DEPRECATED CALCIDIOL+CALCIFEROL SERPL-MC: 86 UG/L (ref 20–75)

## 2022-02-25 NOTE — RESULT ENCOUNTER NOTE
Enedelia,  It was a pleasure to see you in the office recently.   The inflammation tests were normal. This is good and helps us rule out polymyalgia rheumatica (PMR).  The vitamin D level and general autoimmune screening test is still processing. I'll keep you posted.   Please MyChart or call if you have any concerns or questions.   Sincerely,  Jana Ramirez MD

## 2022-02-25 NOTE — RESULT ENCOUNTER NOTE
Ps.  Vitamin D levels have dropped nicely but are still a bit high. Please hold all vitamin D products for another month. You could then restart the multi-vitamin or a low dose vitamin D (< 1,000 international unit(s) per day).   LARS autoimmune test was normal.   Kind regards,  Jana Ramirez MD

## 2022-03-02 ENCOUNTER — THERAPY VISIT (OUTPATIENT)
Dept: PHYSICAL THERAPY | Facility: CLINIC | Age: 67
End: 2022-03-02
Payer: COMMERCIAL

## 2022-03-02 DIAGNOSIS — M25.551 BILATERAL HIP PAIN: ICD-10-CM

## 2022-03-02 DIAGNOSIS — G89.29 CHRONIC RIGHT SHOULDER PAIN: ICD-10-CM

## 2022-03-02 DIAGNOSIS — M25.511 CHRONIC RIGHT SHOULDER PAIN: ICD-10-CM

## 2022-03-02 DIAGNOSIS — M25.552 BILATERAL HIP PAIN: ICD-10-CM

## 2022-03-02 PROCEDURE — 97110 THERAPEUTIC EXERCISES: CPT | Mod: GP | Performed by: PHYSICAL THERAPIST

## 2022-03-23 ENCOUNTER — THERAPY VISIT (OUTPATIENT)
Dept: PHYSICAL THERAPY | Facility: CLINIC | Age: 67
End: 2022-03-23
Payer: COMMERCIAL

## 2022-03-23 DIAGNOSIS — M25.511 CHRONIC RIGHT SHOULDER PAIN: ICD-10-CM

## 2022-03-23 DIAGNOSIS — G89.29 CHRONIC RIGHT SHOULDER PAIN: ICD-10-CM

## 2022-03-23 DIAGNOSIS — M25.551 BILATERAL HIP PAIN: ICD-10-CM

## 2022-03-23 DIAGNOSIS — M25.552 BILATERAL HIP PAIN: ICD-10-CM

## 2022-03-23 PROCEDURE — 97530 THERAPEUTIC ACTIVITIES: CPT | Mod: GP | Performed by: PHYSICAL THERAPIST

## 2022-03-23 PROCEDURE — 97110 THERAPEUTIC EXERCISES: CPT | Mod: GP | Performed by: PHYSICAL THERAPIST

## 2022-03-23 NOTE — PROGRESS NOTES
Subjective:  HPI  Physical Exam                    Objective:  System                   Shoulder Evaluation:  ROM:  AROM:    Flexion:  Right:  163  Extension: Right: 64  Abduction:  Right:  166    Internal Rotation:  Right:  T2-3, no pain                        Strength:    Flexion: Right: 5/5     Pain:     Abduction:  Right: 5/5     Pain:    Internal Rotation:  Right: 5/5     Pain:  External Rotation:   Right:5/5     Pain:                                          Hip Evaluation    Hip Strength:    Flexion:   Left: 5/5   Pain:  Right: 5/5   Pain:                    Extension:  Left: 4+/5  Pain:Right: 5/5    Pain:    Abduction:  Left: 5/5     Pain:Right: 5/5    Pain:                                 General     ROS    Assessment/Plan:    DISCHARGE REPORT    Progress reporting period is from 1/19/22 to 3/23/22.       SUBJECTIVE  Subjective changes noted by patient:  Patient reports symptoms have been significantly improved overall.  Was able to  grandkids without issue, was surprised at ease of this task.  Bilateral hip symptoms also improved overall, can still get mild achiness with weather pattern.       Current pain level is 0/10  .     Previous pain level was  6/10 Initial Pain level: 6/10.   Changes in function:  Yes (See Goal flowsheet attached for changes in current functional level)  Adverse reaction to treatment or activity: None    OBJECTIVE  Changes noted in objective findings:  Yes, increase in right shoulder AROM with decreased pain.  Increase in bilateral hip abduction and extension strength (mild strength deficit remains in left hip extension).        ASSESSMENT/PLAN  Updated problem list and treatment plan: Diagnosis 1:  Bilateral hip  Pain -  home program  Diagnosis 2:  Right shoulder   Pain -  home program  STG/LTGs have been met or progress has been made towards goals:  Yes (See Goal flow sheet completed today.)  Assessment of Progress: The patient has met all of their long term goals.  Self  Management Plans:  Patient is independent in self management of symptoms.  I have re-evaluated this patient and find that the nature, scope, duration and intensity of the therapy is appropriate for the medical condition of the patient.  Елена continues to require the following intervention to meet STG and LTG's:  PT intervention is no longer required to meet STG/LTG.    Recommendations:  This patient is ready to be discharged from therapy and continue their home treatment program.    Please refer to the daily flowsheet for treatment today, total treatment time and time spent performing 1:1 timed codes.

## 2022-03-23 NOTE — PROGRESS NOTES
Discharge Note    Progress reporting period is from last progress note on 03/23/22 to Mar 23, 2022.    Елена failed to follow up and current status is unknown.  Please see information below for last relevant information on current status.  Patient seen for 7 visits.    SUBJECTIVE  Subjective changes noted by patient:     .  Current pain level is 0/10.     Previous pain level was  6/10.   Changes in function:  Yes (See Goal flowsheet attached for changes in current functional level)  Adverse reaction to treatment or activity: None    OBJECTIVE  Changes noted in objective findings:       ASSESSMENT/PLAN  Diagnosis: bilateral hip pain   Updated problem list and treatment plan:   Pain - HEP  Decreased strength - HEP  STG/LTGs have been met or progress has been made towards goals:  Yes, please see goal flowsheet for most current information  Assessment of Progress: current status is unknown.    Last current status:     Self Management Plans:  HEP  I have re-evaluated this patient and find that the nature, scope, duration and intensity of the therapy is appropriate for the medical condition of the patient.  Елена continues to require the following intervention to meet STG and LTG's:  HEP.    Recommendations:  Discharge with current home program.  Patient to follow up with MD as needed.    Please refer to the daily flowsheet for treatment today, total treatment time and time spent performing 1:1 timed codes.

## 2022-04-12 ENCOUNTER — OFFICE VISIT (OUTPATIENT)
Dept: OPHTHALMOLOGY | Facility: CLINIC | Age: 67
End: 2022-04-12
Payer: COMMERCIAL

## 2022-04-12 DIAGNOSIS — H26.9 NUCLEAR CATARACT, NONSENILE: Primary | ICD-10-CM

## 2022-04-12 DIAGNOSIS — H04.123 DRY EYES: ICD-10-CM

## 2022-04-12 PROCEDURE — 92014 COMPRE OPH EXAM EST PT 1/>: CPT | Performed by: STUDENT IN AN ORGANIZED HEALTH CARE EDUCATION/TRAINING PROGRAM

## 2022-04-12 ASSESSMENT — VISUAL ACUITY
METHOD_MR: DECLINED
CORRECTION_TYPE: GLASSES
METHOD: SNELLEN - LINEAR
OS_CC: J1
OD_SC+: -1
OD_CC: J1
OS_SC: 20/20
OD_SC: 20/20

## 2022-04-12 ASSESSMENT — CONF VISUAL FIELD
OS_NORMAL: 1
OD_NORMAL: 1

## 2022-04-12 ASSESSMENT — TONOMETRY
OD_IOP_MMHG: 17
OS_IOP_MMHG: 17
IOP_METHOD: APPLANATION

## 2022-04-12 ASSESSMENT — REFRACTION_WEARINGRX
OS_SPHERE: +1.50
OD_SPHERE: +1.50
SPECS_TYPE: OTC

## 2022-04-12 ASSESSMENT — EXTERNAL EXAM - RIGHT EYE: OD_EXAM: NORMAL

## 2022-04-12 ASSESSMENT — CUP TO DISC RATIO
OD_RATIO: 0.35
OS_RATIO: 0.35

## 2022-04-12 ASSESSMENT — EXTERNAL EXAM - LEFT EYE: OS_EXAM: NORMAL

## 2022-04-12 ASSESSMENT — SLIT LAMP EXAM - LIDS
COMMENTS: NORMAL
COMMENTS: NORMAL

## 2022-04-12 NOTE — PROGRESS NOTES
" Current Eye Medications:  Artificial tears as needed      Subjective:  Complete exam: she states she feels her cataracts are getting worse. She complains of floaters in the right eye. No flashing lights or curtains.     Objective:  See Ophthalmology Exam.       Assessment:  Елена Mix is a 66 year old female who presents with:   Encounter Diagnoses   Name Primary?     Nuclear cataract, nonsenile - Both Eyes Mild. Monitor.     Dry eyes        Plan:  Continue over the counter readers    Continue artificial tears up to four times a day as needed (Refresh Optive, Systane Balance, TheraTears, or generic artificial tears are ok. Avoid \"get the red out\" drops).     Juno Waller MD  [phone]      "

## 2022-04-12 NOTE — LETTER
"    4/12/2022         RE: Елена Mix  [address]  [facility] [address]        Dear Colleague,    Thank you for referring your patient, Елена Mix, to the [facility]. Please see a copy of my visit note below.     Current Eye Medications:  Artificial tears as needed      Subjective:  Complete exam: she states she feels her cataracts are getting worse. She complains of floaters in the right eye. No flashing lights or curtains.     Objective:  See Ophthalmology Exam.       Assessment:  Елена Mix is a 66 year old female who presents with:   Encounter Diagnoses   Name Primary?     Nuclear cataract, nonsenile - Both Eyes Mild. Monitor.     Dry eyes        Plan:  Continue over the counter readers    Continue artificial tears up to four times a day as needed (Refresh Optive, Systane Balance, TheraTears, or generic artificial tears are ok. Avoid \"get the red out\" drops).     Juno Waller MD  [phone]          Again, thank you for allowing me to participate in the care of your patient.        Sincerely,        Juno Waller MD    "

## 2022-04-12 NOTE — PATIENT INSTRUCTIONS
"Continue over the counter readers    Continue artificial tears up to four times a day as needed (Refresh Optive, Systane Balance, TheraTears, or generic artificial tears are ok. Avoid \"get the red out\" drops).     Juno Waller MD  [phone]    " Albendazole Pregnancy And Lactation Text: This medication is Pregnancy Category C and it isn't known if it is safe during pregnancy. It is also excreted in breast milk.

## 2022-10-02 ENCOUNTER — DOCUMENTATION ONLY (OUTPATIENT)
Dept: LAB | Facility: CLINIC | Age: 67
End: 2022-10-02

## 2022-10-02 DIAGNOSIS — Z13.220 SCREENING CHOLESTEROL LEVEL: ICD-10-CM

## 2022-10-02 DIAGNOSIS — Z13.1 SCREENING FOR DIABETES MELLITUS: ICD-10-CM

## 2022-10-02 DIAGNOSIS — E67.3 HYPERVITAMINOSIS D: ICD-10-CM

## 2022-10-02 DIAGNOSIS — R89.9 ABNORMAL LABORATORY TEST RESULT: ICD-10-CM

## 2022-10-02 DIAGNOSIS — R79.89 HIGH SERUM VITAMIN D: Primary | ICD-10-CM

## 2022-10-02 NOTE — PROGRESS NOTES
Patient have a lab appointment on 10-20-22, but there is no order. Could you order please if needed.  Thank you,  Constance Reardon MLT(ASCP)

## 2022-10-09 ENCOUNTER — HEALTH MAINTENANCE LETTER (OUTPATIENT)
Age: 67
End: 2022-10-09

## 2022-10-20 ENCOUNTER — LAB (OUTPATIENT)
Dept: LAB | Facility: CLINIC | Age: 67
End: 2022-10-20
Payer: COMMERCIAL

## 2022-10-20 DIAGNOSIS — Z13.220 SCREENING FOR HYPERLIPIDEMIA: Primary | ICD-10-CM

## 2022-10-20 DIAGNOSIS — E67.3 HYPERVITAMINOSIS D: ICD-10-CM

## 2022-10-20 DIAGNOSIS — R79.89 HIGH SERUM VITAMIN D: ICD-10-CM

## 2022-10-20 DIAGNOSIS — Z13.1 SCREENING FOR DIABETES MELLITUS: ICD-10-CM

## 2022-10-20 DIAGNOSIS — R89.9 ABNORMAL LABORATORY TEST RESULT: ICD-10-CM

## 2022-10-20 LAB
CHOLEST SERPL-MCNC: 244 MG/DL
DEPRECATED CALCIDIOL+CALCIFEROL SERPL-MC: 62 UG/L (ref 20–75)
FASTING STATUS PATIENT QL REPORTED: YES
FASTING STATUS PATIENT QL REPORTED: YES
GLUCOSE BLD-MCNC: 102 MG/DL (ref 70–99)
HDLC SERPL-MCNC: 70 MG/DL
LDLC SERPL CALC-MCNC: 152 MG/DL
NONHDLC SERPL-MCNC: 174 MG/DL
TRIGL SERPL-MCNC: 112 MG/DL

## 2022-10-20 PROCEDURE — 82947 ASSAY GLUCOSE BLOOD QUANT: CPT

## 2022-10-20 PROCEDURE — 80061 LIPID PANEL: CPT

## 2022-10-20 PROCEDURE — 82306 VITAMIN D 25 HYDROXY: CPT

## 2022-10-20 PROCEDURE — 36415 COLL VENOUS BLD VENIPUNCTURE: CPT

## 2022-10-20 NOTE — RESULT ENCOUNTER NOTE
Anam Mcdaniels,     Your cholesterol overall is a bit higher than it was a year ago. The LDL, bad cholesterol is 152, up from 130. We like this less than 100. Your total cholesterol is 244, up from 234 and we like that less than 200. Based off your risk of stroke/heart attack (9.1%), it might be reasonable to consider a cholesterol lowering medication. I recommend talking with Dr. Ramirez about this at your appointment next week.     Your fasting blood sugar was also elevated a 2nd year in a row. This puts you at risk of pre-diabetes. Working on healthy nutrition and exercise can improve this.     Your Vit D level is still pending.    Thank you,  PREMA Evans, NP-C  [facility]    Dr. Ramirez is out of the office and I am one of the providers helping to cover her.         The 10-year ASCVD risk score (Alex BETTENCOURT, et al., 2019) is: 9.1%    Values used to calculate the score:      Age: 67 years      Sex: Female      Is Non- : No      Diabetic: No      Tobacco smoker: No      Systolic Blood Pressure: 132 mmHg      Is BP treated: No      HDL Cholesterol: 70 mg/dL      Total Cholesterol: 244 mg/dL

## 2022-10-23 ASSESSMENT — ENCOUNTER SYMPTOMS
DIZZINESS: 0
NAUSEA: 0
COUGH: 0
HEARTBURN: 0
MYALGIAS: 0
BREAST MASS: 0
EYE PAIN: 0
FEVER: 0
PALPITATIONS: 0
DYSURIA: 0
SHORTNESS OF BREATH: 0
CHILLS: 0
CONSTIPATION: 1
ABDOMINAL PAIN: 0
HEMATOCHEZIA: 0
HEADACHES: 0
HEMATURIA: 0
PARESTHESIAS: 0
NERVOUS/ANXIOUS: 1
JOINT SWELLING: 0
SORE THROAT: 0
FREQUENCY: 0
DIARRHEA: 0
ARTHRALGIAS: 1

## 2022-10-23 ASSESSMENT — ACTIVITIES OF DAILY LIVING (ADL): CURRENT_FUNCTION: NO ASSISTANCE NEEDED

## 2022-10-24 ENCOUNTER — OFFICE VISIT (OUTPATIENT)
Dept: FAMILY MEDICINE | Facility: CLINIC | Age: 67
End: 2022-10-24
Payer: COMMERCIAL

## 2022-10-24 VITALS
SYSTOLIC BLOOD PRESSURE: 128 MMHG | DIASTOLIC BLOOD PRESSURE: 77 MMHG | BODY MASS INDEX: 21.6 KG/M2 | TEMPERATURE: 98.3 F | WEIGHT: 126.5 LBS | HEART RATE: 86 BPM | HEIGHT: 64 IN | OXYGEN SATURATION: 99 % | RESPIRATION RATE: 15 BRPM

## 2022-10-24 DIAGNOSIS — L98.9 SKIN LESION: ICD-10-CM

## 2022-10-24 DIAGNOSIS — Z00.00 ENCOUNTER FOR MEDICARE ANNUAL WELLNESS EXAM: Primary | ICD-10-CM

## 2022-10-24 DIAGNOSIS — Z13.220 SCREENING CHOLESTEROL LEVEL: ICD-10-CM

## 2022-10-24 DIAGNOSIS — M85.80 OSTEOPENIA, UNSPECIFIED LOCATION: ICD-10-CM

## 2022-10-24 DIAGNOSIS — R03.0 ELEVATED BLOOD PRESSURE READING WITHOUT DIAGNOSIS OF HYPERTENSION: ICD-10-CM

## 2022-10-24 DIAGNOSIS — E78.5 HYPERLIPIDEMIA LDL GOAL <130: ICD-10-CM

## 2022-10-24 DIAGNOSIS — N95.2 ATROPHIC VAGINITIS: ICD-10-CM

## 2022-10-24 DIAGNOSIS — G47.00 INSOMNIA, UNSPECIFIED TYPE: ICD-10-CM

## 2022-10-24 DIAGNOSIS — K59.09 CHRONIC CONSTIPATION: ICD-10-CM

## 2022-10-24 PROCEDURE — 17000 DESTRUCT PREMALG LESION: CPT | Performed by: FAMILY MEDICINE

## 2022-10-24 PROCEDURE — 99213 OFFICE O/P EST LOW 20 MIN: CPT | Mod: 25 | Performed by: FAMILY MEDICINE

## 2022-10-24 PROCEDURE — G0439 PPPS, SUBSEQ VISIT: HCPCS | Performed by: FAMILY MEDICINE

## 2022-10-24 RX ORDER — ALENDRONATE SODIUM 35 MG/1
35 TABLET ORAL
Qty: 12 TABLET | Refills: 3 | Status: SHIPPED | OUTPATIENT
Start: 2022-10-24 | End: 2023-10-27

## 2022-10-24 RX ORDER — ESTRADIOL 0.1 MG/G
CREAM VAGINAL
Qty: 42.5 G | Refills: 0 | Status: SHIPPED | OUTPATIENT
Start: 2022-10-24 | End: 2022-10-24

## 2022-10-24 RX ORDER — ESTRADIOL 0.1 MG/G
CREAM VAGINAL
Qty: 42.5 G | Refills: 0 | Status: SHIPPED | OUTPATIENT
Start: 2022-10-24 | End: 2023-01-10

## 2022-10-24 RX ORDER — TRAZODONE HYDROCHLORIDE 50 MG/1
25-50 TABLET, FILM COATED ORAL AT BEDTIME
Qty: 30 TABLET | Refills: 3 | Status: SHIPPED | OUTPATIENT
Start: 2022-10-24 | End: 2023-01-10

## 2022-10-24 ASSESSMENT — PAIN SCALES - GENERAL: PAINLEVEL: NO PAIN (0)

## 2022-10-24 ASSESSMENT — ENCOUNTER SYMPTOMS
DIZZINESS: 0
FEVER: 0
SHORTNESS OF BREATH: 0
HEMATURIA: 0
PARESTHESIAS: 0
MYALGIAS: 0
ABDOMINAL PAIN: 0
PALPITATIONS: 0
HEARTBURN: 0
HEMATOCHEZIA: 0
CONSTIPATION: 1
COUGH: 0
HEADACHES: 0
FREQUENCY: 0
DYSURIA: 0
DIARRHEA: 0
NAUSEA: 0
CHILLS: 0
NERVOUS/ANXIOUS: 1
BREAST MASS: 0
SORE THROAT: 0
JOINT SWELLING: 0
ARTHRALGIAS: 1
EYE PAIN: 0

## 2022-10-24 ASSESSMENT — ACTIVITIES OF DAILY LIVING (ADL): CURRENT_FUNCTION: NO ASSISTANCE NEEDED

## 2022-10-24 NOTE — PATIENT INSTRUCTIONS
Schedule fasting labs in 3-6 months.     Patient Education   Personalized Prevention Plan  You are due for the preventive services outlined below.  Your care team is available to assist you in scheduling these services.  If you have already completed any of these items, please share that information with your care team to update in your medical record.  Health Maintenance Due   Topic Date Due    Hepatitis B Vaccine (1 of 3 - 3-dose series) Never done    Annual Wellness Visit  10/20/2022

## 2022-10-24 NOTE — TELEPHONE ENCOUNTER
[facility] calling.  There are two sets of directions on the prescription sent over for estradiol.  They are the same.  Did you mean to remove one or should they have been different?  Ok to resend prescription with clarification.  Sulema STEPHENSN, RN

## 2022-11-17 ENCOUNTER — MYC MEDICAL ADVICE (OUTPATIENT)
Dept: FAMILY MEDICINE | Facility: CLINIC | Age: 67
End: 2022-11-17

## 2022-11-17 NOTE — TELEPHONE ENCOUNTER
Routing to provider to review and advise.    SANDER Sheehan  [facility]

## 2022-11-28 ENCOUNTER — TRANSFERRED RECORDS (OUTPATIENT)
Dept: HEALTH INFORMATION MANAGEMENT | Facility: CLINIC | Age: 67
End: 2022-11-28

## 2023-01-10 ENCOUNTER — MYC MEDICAL ADVICE (OUTPATIENT)
Dept: FAMILY MEDICINE | Facility: CLINIC | Age: 68
End: 2023-01-10

## 2023-01-10 ENCOUNTER — LAB (OUTPATIENT)
Dept: LAB | Facility: CLINIC | Age: 68
End: 2023-01-10
Payer: COMMERCIAL

## 2023-01-10 DIAGNOSIS — Z13.220 SCREENING CHOLESTEROL LEVEL: ICD-10-CM

## 2023-01-10 DIAGNOSIS — E78.5 HYPERLIPIDEMIA LDL GOAL <130: Primary | ICD-10-CM

## 2023-01-10 LAB
CHOLEST SERPL-MCNC: 224 MG/DL
FASTING STATUS PATIENT QL REPORTED: YES
HDLC SERPL-MCNC: 78 MG/DL
LDLC SERPL CALC-MCNC: 125 MG/DL
NONHDLC SERPL-MCNC: 146 MG/DL
TRIGL SERPL-MCNC: 107 MG/DL

## 2023-01-10 PROCEDURE — 36415 COLL VENOUS BLD VENIPUNCTURE: CPT

## 2023-01-10 PROCEDURE — 80061 LIPID PANEL: CPT

## 2023-01-10 RX ORDER — ATORVASTATIN CALCIUM 10 MG/1
10 TABLET, FILM COATED ORAL DAILY
Qty: 30 TABLET | Refills: 2 | Status: SHIPPED | OUTPATIENT
Start: 2023-01-10 | End: 2023-04-10

## 2023-01-10 NOTE — RESULT ENCOUNTER NOTE
Enedelia,  Your cholesterol panel does show some improvement in the LDL (bad cholesterol) since last check. Good job!  The 10-year cardiovascular risk score is improved but still elevated in the range where a cholesterol medication (statin) could be beneficial to lower your heart disease risk.  Certainly, let me know if you would like to start any formal medication treatment.  Otherwise we will plan to recheck your cholesterol again next year at your physical  Please MyChart or call if you have any concerns or questions.   Sincerely,  Jana Ramirez MD    The 10-year ASCVD risk score (Alex BETTENCOURT, et al., 2019) is: 8.2%    Values used to calculate the score:      Age: 67 years      Sex: Female      Is Non- : No      Diabetic: No      Tobacco smoker: No      Systolic Blood Pressure: 128 mmHg      Is BP treated: No      HDL Cholesterol: 78 mg/dL      Total Cholesterol: 224 mg/dL

## 2023-04-13 ENCOUNTER — LAB (OUTPATIENT)
Dept: LAB | Facility: CLINIC | Age: 68
End: 2023-04-13
Payer: COMMERCIAL

## 2023-04-13 DIAGNOSIS — E78.5 HYPERLIPIDEMIA LDL GOAL <130: ICD-10-CM

## 2023-04-13 LAB
ALT SERPL W P-5'-P-CCNC: 45 U/L (ref 0–50)
AST SERPL W P-5'-P-CCNC: 26 U/L (ref 0–45)
CHOLEST SERPL-MCNC: 168 MG/DL
CK SERPL-CCNC: 56 U/L (ref 30–225)
FASTING STATUS PATIENT QL REPORTED: YES
HDLC SERPL-MCNC: 82 MG/DL
LDLC SERPL CALC-MCNC: 74 MG/DL
NONHDLC SERPL-MCNC: 86 MG/DL
TRIGL SERPL-MCNC: 59 MG/DL

## 2023-04-13 PROCEDURE — 84460 ALANINE AMINO (ALT) (SGPT): CPT

## 2023-04-13 PROCEDURE — 82550 ASSAY OF CK (CPK): CPT

## 2023-04-13 PROCEDURE — 84450 TRANSFERASE (AST) (SGOT): CPT

## 2023-04-13 PROCEDURE — 36415 COLL VENOUS BLD VENIPUNCTURE: CPT

## 2023-04-13 PROCEDURE — 80061 LIPID PANEL: CPT

## 2023-04-14 NOTE — RESULT ENCOUNTER NOTE
Enedelia,  Your cholesterol panel looks excellent!  The liver and muscle test were normal showing your body is tolerating the medication well.  Please continue the atorvastatin provided you are not having any concerns or side effects from it.  We will continue to monitor your cholesterol annually at your physical.  Please MyChart or call if you have any concerns or questions.   Sincerely,  Jana Ramirez MD

## 2023-06-01 ENCOUNTER — OFFICE VISIT (OUTPATIENT)
Dept: OPHTHALMOLOGY | Facility: CLINIC | Age: 68
End: 2023-06-01
Payer: COMMERCIAL

## 2023-06-01 DIAGNOSIS — H52.223 MYOPIA OF BOTH EYES WITH REGULAR ASTIGMATISM AND PRESBYOPIA: ICD-10-CM

## 2023-06-01 DIAGNOSIS — H25.13 NUCLEAR SCLEROTIC CATARACT OF BOTH EYES: Primary | ICD-10-CM

## 2023-06-01 DIAGNOSIS — H52.13 MYOPIA OF BOTH EYES WITH REGULAR ASTIGMATISM AND PRESBYOPIA: ICD-10-CM

## 2023-06-01 DIAGNOSIS — H04.123 DRY EYES: ICD-10-CM

## 2023-06-01 DIAGNOSIS — H52.4 MYOPIA OF BOTH EYES WITH REGULAR ASTIGMATISM AND PRESBYOPIA: ICD-10-CM

## 2023-06-01 PROCEDURE — 92014 COMPRE OPH EXAM EST PT 1/>: CPT | Performed by: STUDENT IN AN ORGANIZED HEALTH CARE EDUCATION/TRAINING PROGRAM

## 2023-06-01 PROCEDURE — 92015 DETERMINE REFRACTIVE STATE: CPT | Performed by: STUDENT IN AN ORGANIZED HEALTH CARE EDUCATION/TRAINING PROGRAM

## 2023-06-01 ASSESSMENT — CONF VISUAL FIELD
OD_INFERIOR_NASAL_RESTRICTION: 0
OD_NORMAL: 1
OD_SUPERIOR_NASAL_RESTRICTION: 0
OD_INFERIOR_TEMPORAL_RESTRICTION: 0
OS_SUPERIOR_TEMPORAL_RESTRICTION: 0
OS_INFERIOR_NASAL_RESTRICTION: 0
OS_INFERIOR_TEMPORAL_RESTRICTION: 0
OS_NORMAL: 1
OS_SUPERIOR_NASAL_RESTRICTION: 0
OD_SUPERIOR_TEMPORAL_RESTRICTION: 0

## 2023-06-01 ASSESSMENT — TONOMETRY
OS_IOP_MMHG: 16
OD_IOP_MMHG: 14
IOP_METHOD: APPLANATION

## 2023-06-01 ASSESSMENT — SLIT LAMP EXAM - LIDS
COMMENTS: NORMAL
COMMENTS: NORMAL

## 2023-06-01 ASSESSMENT — REFRACTION_MANIFEST
OS_SPHERE: -0.50
OD_SPHERE: -0.75
OS_CYLINDER: +0.50
OD_ADD: +2.50
OS_ADD: +2.50
OS_AXIS: 015
OD_CYLINDER: +1.75
OD_AXIS: 170

## 2023-06-01 ASSESSMENT — EXTERNAL EXAM - RIGHT EYE: OD_EXAM: NORMAL

## 2023-06-01 ASSESSMENT — VISUAL ACUITY
OD_CC: J1+ BE
METHOD: SNELLEN - LINEAR
OS_SC+: +2
OD_SC+: +2
OS_SC: 20/25
OD_SC: 20/30

## 2023-06-01 ASSESSMENT — REFRACTION_WEARINGRX
OS_SPHERE: +1.75
OD_CYLINDER: SPHERE
OD_SPHERE: +1.75
OS_CYLINDER: SPHERE

## 2023-06-01 ASSESSMENT — CUP TO DISC RATIO
OS_RATIO: 0.35
OD_RATIO: 0.35

## 2023-06-01 ASSESSMENT — EXTERNAL EXAM - LEFT EYE: OS_EXAM: NORMAL

## 2023-06-01 NOTE — PATIENT INSTRUCTIONS
Continue over the counter readers or can fill glasses prescription given     May try Zaditor twice a day as needed for eye itchiness (over-the-counter eyedrop).     Juno Waller MD  [phone]

## 2023-06-01 NOTE — PROGRESS NOTES
Current Eye Medications:  None    Subjective:  Patient is here for complete eye exam.  She notes her distance vision is okay. She does have trouble with night vision. Feels like a film over the right eye.  Wears OTC readers only.   Itching and dryness in both eyes, due to allergies.  Floaters occasionally. No flashes of lights.     TENISHA Mena  1:43 PM 06/01/2023    Objective:  See Ophthalmology Exam.      Assessment:  Елена Mix is a 67 year old female who presents with:   Encounter Diagnoses   Name Primary?     Nuclear sclerotic cataract of both eyes Approaching visually significant. BAT at next visit.     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     Juno Waller MD  [phone]

## 2023-06-01 NOTE — LETTER
6/1/2023         RE: Елена Mix  [address]  [facility] [address]        Dear Colleague,    Thank you for referring your patient, Елена Mix, to the [facility]. Please see a copy of my visit note below.    Current Eye Medications:  None    Subjective:  Patient is here for complete eye exam.  She notes her distance vision is okay. She does have trouble with night vision. Feels like a film over the right eye.  Wears OTC readers only.   Itching and dryness in both eyes, due to allergies.  Floaters occasionally. No flashes of lights.     TENISHA Mena  1:43 PM 06/01/2023    Objective:  See Ophthalmology Exam.      Assessment:  Елена Mix is a 67 year old female who presents with:   Encounter Diagnoses   Name Primary?     Nuclear sclerotic cataract of both eyes Approaching visually significant. BAT at next visit.     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     Juno Waller MD  [phone]          Again, thank you for allowing me to participate in the care of your patient.        Sincerely,        Juno Waller MD

## 2023-07-05 ENCOUNTER — NURSE TRIAGE (OUTPATIENT)
Dept: FAMILY MEDICINE | Facility: CLINIC | Age: 68
End: 2023-07-05
Payer: COMMERCIAL
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
## 2023-07-06 ENCOUNTER — VIRTUAL VISIT (OUTPATIENT)
Dept: FAMILY MEDICINE | Facility: CLINIC | Age: 68
End: 2023-07-06
Payer: COMMERCIAL

## 2023-07-06 DIAGNOSIS — J06.9 UPPER RESPIRATORY TRACT INFECTION, UNSPECIFIED TYPE: Primary | ICD-10-CM

## 2023-07-06 DIAGNOSIS — H66.93 ACUTE EAR INFECTION, BILATERAL: ICD-10-CM

## 2023-07-06 PROCEDURE — 99213 OFFICE O/P EST LOW 20 MIN: CPT | Mod: 95 | Performed by: PHYSICIAN ASSISTANT

## 2023-07-06 RX ORDER — PSEUDOEPHEDRINE HCL 120 MG/1
120 TABLET, FILM COATED, EXTENDED RELEASE ORAL EVERY 12 HOURS
Qty: 28 TABLET | Refills: 0 | Status: SHIPPED | OUTPATIENT
Start: 2023-07-06 | End: 2023-10-27

## 2023-07-06 RX ORDER — GUAIFENESIN AND DEXTROMETHORPHAN HYDROBROMIDE 1200; 60 MG/1; MG/1
1 TABLET, EXTENDED RELEASE ORAL 2 TIMES DAILY
Qty: 28 TABLET | Refills: 0 | Status: SHIPPED | OUTPATIENT
Start: 2023-07-06 | End: 2023-10-27

## 2023-07-06 NOTE — PATIENT INSTRUCTIONS
At [facility], we strive to deliver an exceptional experience to you, every time we see you. If you receive a survey, please complete it as we do value your feedback.  If you have MyChart, you can expect to receive results automatically within 24 hours of their completion.  Your provider will send a note interpreting your results as well.   If you do not have MyChart, you should receive your results in about a week by mail.    Your care team:                            Family Medicine Internal Medicine   MD Gregorio Benton MD Shantel Branch-Fleming, MD Srinivasa Vaka, MD Katya Belousova, PAPREMA Landin CNP, MD ([facility]   Brian Hyman, MD Erlinda Friend MD Amelia Massimini APRN ALYSSA To APRN MD Kristine Angel MD          Clinic hours: Monday - Thursday 7 am-6 pm; Fridays 7 am-5 pm.   Urgent care: Monday - Friday 10 am- 8 pm; Saturday and Sunday 9 am-5 pm.    Clinic: [phone]       [facility]: Monday - Thursday 8 am - 7 pm; Friday 8 am - 6 pm  [facility]: [phone]

## 2023-07-06 NOTE — PROGRESS NOTES
Enedelia is a 67 year old who is being evaluated via a billable phone visit.      How would you like to obtain your AVS? MyChart  If the video visit is dropped, the invitation should be resent by: Text to cell phone: [phone]  Will anyone else be joining your video visit? No        Assessment & Plan   Problem List Items Addressed This Visit    None  Visit Diagnoses     Upper respiratory tract infection, unspecified type    -  Primary    Relevant Medications    Dextromethorphan-Guaifenesin  MG TB12    pseudoePHEDrine (SUDAFED) 120 MG 12 hr tablet    Acute ear infection, bilateral             Continue z-pack, if nausea continues, call back to get on amoxicillin  Take Sudafed 120 mg for 7-14 days as needed   Mucinex Dm 1 tablet twice a day for 7-14 days  Ibuprofen 600 mg every 6 hours as needed       20 minutes spent by me on the date of the encounter doing chart review, history and exam, documentation and further activities per the note           SHERLY Mcdonnell [facility]   Enedelia is a 67 year old, presenting for the following health issues:  Cough and Nasal Congestion        7/6/2023     7:48 AM   Additional Questions   Roomed by Jyoti HUBER     Acute Illness  Acute illness concerns: Ear pain, cough, congestion  Onset/Duration: 4 days ago   Symptoms:  Fever: YES- low grade   Chills/Sweats: YES- chills   Headache (location?): YES  Sinus Pressure: YES  Conjunctivitis:  No  Ear Pain: YES: both  Rhinorrhea: YES  Congestion: YES  Sore Throat: YES  Cough: YES-non-productive  Wheeze: No  Decreased Appetite: YES  Nausea: YES  Vomiting: No  Diarrhea: No  Dysuria/Freq.: No  Dysuria or Hematuria: No  Fatigue/Achiness: YES  Sick/Strep Exposure: No  Therapies tried and outcome: Went to  last night and was given a z-shellie, it made her nauseous from the first  double dose.           Review of Systems   Constitutional, HEENT, cardiovascular, pulmonary, gi and gu systems  are negative, except as otherwise noted.      Objective           Vitals:  No vitals were obtained today due to virtual visit.    Physical Exam   GENERAL: Healthy, alert and no distress  EYES: Eyes grossly normal to inspection.  No discharge or erythema, or obvious scleral/conjunctival abnormalities.  RESP: No audible wheeze, cough, or visible cyanosis.  No visible retractions or increased work of breathing.    SKIN: Visible skin clear. No significant rash, abnormal pigmentation or lesions.  NEURO: Cranial nerves grossly intact.  Mentation and speech appropriate for age.  PSYCH: Mentation appears normal, affect normal/bright, judgement and insight intact, normal speech and appearance well-groomed.                Visit Details    Type of service:  phone-20 minutes

## 2023-08-28 ENCOUNTER — APPOINTMENT (OUTPATIENT)
Dept: LAB | Facility: CLINIC | Age: 68
End: 2023-08-28
Payer: COMMERCIAL

## 2023-08-28 ENCOUNTER — VIRTUAL VISIT (OUTPATIENT)
Dept: FAMILY MEDICINE | Facility: CLINIC | Age: 68
End: 2023-08-28
Payer: COMMERCIAL

## 2023-08-28 DIAGNOSIS — J02.9 SORE THROAT: Primary | ICD-10-CM

## 2023-08-28 LAB
DEPRECATED S PYO AG THROAT QL EIA: NEGATIVE
GROUP A STREP BY PCR: NOT DETECTED

## 2023-08-28 PROCEDURE — 87651 STREP A DNA AMP PROBE: CPT | Performed by: FAMILY MEDICINE

## 2023-08-28 PROCEDURE — 99213 OFFICE O/P EST LOW 20 MIN: CPT | Mod: 95 | Performed by: FAMILY MEDICINE

## 2023-08-28 NOTE — RESULT ENCOUNTER NOTE
Please inform of results if patient has not viewed in [facility] within 3 business days.    Your follow-up strep test was normal.    Please call the clinic with any questions you may have.     Have a great day,    Dr. Woodruff

## 2023-08-28 NOTE — PROGRESS NOTES
Enedelia is a 67 year old who is being evaluated via a billable telephone visit.      What phone number would you like to be contacted at? [phone]  How would you like to obtain your AVS? Tehart      D[place] Location (provider location):  On-site    Assessment & Plan   1. Sore throat  Discussed conservative cares. Patient to do home covid test. Awaiting follow-up strep results.      Sancho Mendosa MD  [facility]    Disclaimer: This note consists of symbols derived from keyboarding, dictation and/or voice recognition software. As a result, there may be errors in the script that have gone undetected. Please consider this when interpreting information found in this chart.      Subjective   Enedelia is a 67 year old, presenting for the following health issues:  URI      8/28/2023    11:50 AM   Additional Questions   Roomed by ten   Accompanied by self     URI    History of Present Illness       Reason for visit:  Exposed to Strep on Friday 8/26 - (granddaughter)  Symptom onset:  1-3 days ago  Symptoms include:  Headache, sinus, sore throat  Symptom intensity:  Mild  Symptom progression:  Improving  Had these symptoms before:  Yes  Has tried/received treatment for these symptoms:  Yes  Previous treatment was successful:  Yes  Prior treatment description:  When symptoms lasted 10 days - Azithromycin  What makes it worse:  No  What makes it better:  Taking Sudafed and Zicam as directed on pkgs    She eats 4 or more servings of fruits and vegetables daily.She consumes 0 sweetened beverage(s) daily.She exercises with enough effort to increase her heart rate 30 to 60 minutes per day.  She exercises with enough effort to increase her heart rate 4 days per week.   She is taking medications regularly.     Has not done home covid test.    Patient completed E-check in. Questionnaires were blown in and Patient checked in electronically. Patient was attempted with no return of phone call. Any  additional information will need to be completed by the provider.      Review of Systems   Constitutional, HEENT, cardiovascular, pulmonary, GI, , musculoskeletal, neuro, skin, endocrine and psych systems are negative, except as otherwise noted.      Objective           Vitals:  No vitals were obtained today due to virtual visit.    Physical Exam   healthy, alert, and no distress  PSYCH: Alert and oriented times 3; coherent speech, normal   rate and volume, able to articulate logical thoughts, able   to abstract reason, no tangential thoughts, no hallucinations   or delusions  Her affect is normal  RESP: No cough, no audible wheezing, able to talk in full sentences  Remainder of exam unable to be completed due to telephone visits    Results for orders placed or performed in visit on 08/28/23 (from the past 24 hour(s))   Streptococcus A Rapid Screen w/Reflex to PCR - Clinic Collect    Specimen: Throat; Swab   Result Value Ref Range    Group A Strep antigen Negative Negative         Phone call duration: 5 minutes

## 2023-08-28 NOTE — RESULT ENCOUNTER NOTE
Please inform of results if patient has not viewed in [email or web address] within 3 business days.    Your initial strep test is normal, your follow up strep test is pending.    Please call the clinic with any questions you may have.     Have a great day,    Dr. Woodruff

## 2023-10-20 ENCOUNTER — DOCUMENTATION ONLY (OUTPATIENT)
Dept: FAMILY MEDICINE | Facility: CLINIC | Age: 68
End: 2023-10-20
Payer: COMMERCIAL

## 2023-10-20 DIAGNOSIS — Z13.1 SCREENING FOR DIABETES MELLITUS: ICD-10-CM

## 2023-10-20 DIAGNOSIS — R79.89 HIGH SERUM VITAMIN D: ICD-10-CM

## 2023-10-20 DIAGNOSIS — E78.5 HYPERLIPIDEMIA LDL GOAL <130: Primary | ICD-10-CM

## 2023-10-20 ASSESSMENT — ENCOUNTER SYMPTOMS
NAUSEA: 0
SORE THROAT: 0
DYSURIA: 0
BREAST MASS: 0
WEAKNESS: 0
CONSTIPATION: 0
PALPITATIONS: 0
HEADACHES: 0
DIZZINESS: 0
PARESTHESIAS: 0
NERVOUS/ANXIOUS: 0
HEMATURIA: 0
FEVER: 0
EYE PAIN: 0
MYALGIAS: 0
DIARRHEA: 0
JOINT SWELLING: 0
SHORTNESS OF BREATH: 0
FREQUENCY: 0
COUGH: 1
HEMATOCHEZIA: 0
HEARTBURN: 0
CHILLS: 0
ARTHRALGIAS: 0
ABDOMINAL PAIN: 0

## 2023-10-20 ASSESSMENT — ACTIVITIES OF DAILY LIVING (ADL): CURRENT_FUNCTION: NO ASSISTANCE NEEDED

## 2023-10-20 NOTE — PROGRESS NOTES
Patient have Lab appointment on 10/27/23 and there is no orders. Please order test if needed.  Thanks  Ivania Cade MLT([facility])

## 2023-10-27 ENCOUNTER — LAB (OUTPATIENT)
Dept: LAB | Facility: CLINIC | Age: 68
End: 2023-10-27
Payer: COMMERCIAL

## 2023-10-27 ENCOUNTER — OFFICE VISIT (OUTPATIENT)
Dept: FAMILY MEDICINE | Facility: CLINIC | Age: 68
End: 2023-10-27
Payer: COMMERCIAL

## 2023-10-27 VITALS
TEMPERATURE: 98.2 F | SYSTOLIC BLOOD PRESSURE: 142 MMHG | RESPIRATION RATE: 19 BRPM | DIASTOLIC BLOOD PRESSURE: 89 MMHG | HEIGHT: 64 IN | OXYGEN SATURATION: 99 % | BODY MASS INDEX: 22.21 KG/M2 | WEIGHT: 130.1 LBS | HEART RATE: 77 BPM

## 2023-10-27 DIAGNOSIS — Z13.1 SCREENING FOR DIABETES MELLITUS: ICD-10-CM

## 2023-10-27 DIAGNOSIS — Z29.11 NEED FOR VACCINATION AGAINST RESPIRATORY SYNCYTIAL VIRUS: ICD-10-CM

## 2023-10-27 DIAGNOSIS — E78.5 HYPERLIPIDEMIA LDL GOAL <130: ICD-10-CM

## 2023-10-27 DIAGNOSIS — Z12.31 VISIT FOR SCREENING MAMMOGRAM: ICD-10-CM

## 2023-10-27 DIAGNOSIS — M85.80 OSTEOPENIA, UNSPECIFIED LOCATION: ICD-10-CM

## 2023-10-27 DIAGNOSIS — Z00.00 ENCOUNTER FOR MEDICARE ANNUAL WELLNESS EXAM: Primary | ICD-10-CM

## 2023-10-27 DIAGNOSIS — R79.89 HIGH SERUM VITAMIN D: ICD-10-CM

## 2023-10-27 DIAGNOSIS — M85.88 OTHER SPECIFIED DISORDERS OF BONE DENSITY AND STRUCTURE, OTHER SITE: ICD-10-CM

## 2023-10-27 DIAGNOSIS — Z00.00 ROUTINE GENERAL MEDICAL EXAMINATION AT A HEALTH CARE FACILITY: ICD-10-CM

## 2023-10-27 LAB
ALBUMIN SERPL BCG-MCNC: 4.6 G/DL (ref 3.5–5.2)
ALP SERPL-CCNC: 71 U/L (ref 35–104)
ALT SERPL W P-5'-P-CCNC: 27 U/L (ref 0–50)
ANION GAP SERPL CALCULATED.3IONS-SCNC: 10 MMOL/L (ref 7–15)
AST SERPL W P-5'-P-CCNC: 33 U/L (ref 0–45)
BILIRUB SERPL-MCNC: 0.7 MG/DL
BUN SERPL-MCNC: 11.6 MG/DL (ref 8–23)
CALCIUM SERPL-MCNC: 10.2 MG/DL (ref 8.8–10.2)
CHLORIDE SERPL-SCNC: 102 MMOL/L (ref 98–107)
CHOLEST SERPL-MCNC: 182 MG/DL
CREAT SERPL-MCNC: 0.78 MG/DL (ref 0.51–0.95)
DEPRECATED HCO3 PLAS-SCNC: 27 MMOL/L (ref 22–29)
EGFRCR SERPLBLD CKD-EPI 2021: 81 ML/MIN/1.73M2
GLUCOSE SERPL-MCNC: 103 MG/DL (ref 70–99)
HDLC SERPL-MCNC: 71 MG/DL
LDLC SERPL CALC-MCNC: 95 MG/DL
NONHDLC SERPL-MCNC: 111 MG/DL
POTASSIUM SERPL-SCNC: 4.9 MMOL/L (ref 3.4–5.3)
PROT SERPL-MCNC: 7.6 G/DL (ref 6.4–8.3)
SODIUM SERPL-SCNC: 139 MMOL/L (ref 135–145)
TRIGL SERPL-MCNC: 81 MG/DL

## 2023-10-27 PROCEDURE — 82306 VITAMIN D 25 HYDROXY: CPT

## 2023-10-27 PROCEDURE — 80053 COMPREHEN METABOLIC PANEL: CPT

## 2023-10-27 PROCEDURE — 80061 LIPID PANEL: CPT

## 2023-10-27 PROCEDURE — 36415 COLL VENOUS BLD VENIPUNCTURE: CPT

## 2023-10-27 PROCEDURE — G0439 PPPS, SUBSEQ VISIT: HCPCS | Performed by: PHYSICIAN ASSISTANT

## 2023-10-27 PROCEDURE — 99213 OFFICE O/P EST LOW 20 MIN: CPT | Mod: 25 | Performed by: PHYSICIAN ASSISTANT

## 2023-10-27 RX ORDER — ATORVASTATIN CALCIUM 10 MG/1
10 TABLET, FILM COATED ORAL DAILY
Qty: 90 TABLET | Refills: 3 | Status: SHIPPED | OUTPATIENT
Start: 2023-10-27

## 2023-10-27 RX ORDER — ALENDRONATE SODIUM 35 MG/1
35 TABLET ORAL
Qty: 12 TABLET | Refills: 3 | Status: SHIPPED | OUTPATIENT
Start: 2023-10-27

## 2023-10-27 RX ORDER — RESPIRATORY SYNCYTIAL VIRUS VACCINE 120MCG/0.5
0.5 KIT INTRAMUSCULAR ONCE
Qty: 1 EACH | Refills: 0 | Status: CANCELLED | OUTPATIENT
Start: 2023-10-27 | End: 2023-10-27

## 2023-10-27 ASSESSMENT — ENCOUNTER SYMPTOMS
JOINT SWELLING: 0
PALPITATIONS: 0
WEAKNESS: 0
SHORTNESS OF BREATH: 0
CHILLS: 0
BREAST MASS: 0
SORE THROAT: 0
MYALGIAS: 0
HEARTBURN: 0
HEMATOCHEZIA: 0
COUGH: 1
FEVER: 0
CONSTIPATION: 0
HEMATURIA: 0
DIZZINESS: 0
DYSURIA: 0
ARTHRALGIAS: 0
NERVOUS/ANXIOUS: 0
EYE PAIN: 0
NAUSEA: 0
HEADACHES: 0
PARESTHESIAS: 0
DIARRHEA: 0
ABDOMINAL PAIN: 0
FREQUENCY: 0

## 2023-10-27 ASSESSMENT — ACTIVITIES OF DAILY LIVING (ADL): CURRENT_FUNCTION: NO ASSISTANCE NEEDED

## 2023-10-27 ASSESSMENT — PAIN SCALES - GENERAL: PAINLEVEL: NO PAIN (0)

## 2023-10-27 NOTE — PROGRESS NOTES
"SUBJECTIVE:   Enedelia is a 68 year old who presents for Preventive Visit.      Are you in the first 12 months of your Medicare coverage?  No    Healthy Habits:     In general, how would you rate your overall health?  Excellent    Frequency of exercise:  4-5 days/week    Duration of exercise:  30-45 minutes    Do you usually eat at least 4 servings of fruit and vegetables a day, include whole grains    & fiber and avoid regularly eating high fat or \"junk\" foods?  Yes    Taking medications regularly:  Yes    Medication side effects:  Not applicable    Ability to successfully perform activities of daily living:  No assistance needed    Home Safety:  No safety concerns identified    Hearing Impairment:  No hearing concerns    In the past 6 months, have you been bothered by leaking of urine?  No    In general, how would you rate your overall mental or emotional health?  Excellent    Additional concerns today:  Yes      Today's PHQ-2 Score:       10/27/2023     1:04 PM   PHQ-2 ( 1999 [employer])   Q1: Little interest or pleasure in doing things 0   Q2: Feeling down, depressed or hopeless 0   PHQ-2 Score 0   Q1: Little interest or pleasure in doing things Not at all   Q2: Feeling down, depressed or hopeless Not at all   PHQ-2 Score 0           Have you ever done Advance Care Planning? (For example, a Health Directive, POLST, or a discussion with a medical provider or your loved ones about your wishes): Yes, advance care planning is on file.       Fall risk  Fallen 2 or more times in the past year?: No  Any fall with injury in the past year?: No    Cognitive Screening   1) Repeat 3 items (Leader, Season, Table)    2) Clock draw: NORMAL  3) 3 item recall: Recalls 3 objects  Results: 3 items recalled: COGNITIVE IMPAIRMENT LESS LIKELY    Mini-CogTM Copyright S Su. Licensed by the author for use in [facility]; reprinted with permission (pramod@.[email or web address]). All rights reserved.      Do you have sleep apnea, excessive " snoring or daytime drowsiness? : no    Reviewed and updated as needed this visit by clinical staff   Tobacco  Allergies  Meds  Problems  Med Hx  Surg Hx  Fam Hx  Soc   Hx        Reviewed and updated as needed this visit by Provider   Tobacco   Meds  Problems  Med Hx  Surg Hx  Fam Hx  Soc Hx       Social History     Tobacco Use    Smoking status: Never    Smokeless tobacco: Never   Substance Use Topics    Alcohol use: Yes     Comment: 1 glass of wine 2-3 x's a week.             10/20/2023    10:25 AM   Alcohol Use   Prescreen: >3 drinks/day or >7 drinks/week? No     Do you have a current opioid prescription? No  Do you use any other controlled substances or medications that are not prescribed by a provider? None              Current providers sharing in care for this patient include:   Patient Care Team:  Jana Ramirez MD as PCP - General (Family Practice)  Jana Ramirez MD as Assigned PCP  Juno Waller MD as Assigned Surgical Provider  Juno Waller MD as MD (Ophthalmology)    The following health maintenance items are reviewed in Epic and correct as of today:  Health Maintenance   Topic Date Due    GLUCOSE  10/20/2023    MEDICARE ANNUAL WELLNESS VISIT  10/24/2023    DEXA  01/03/2024    LIPID  04/13/2024    DTAP/TDAP/TD IMMUNIZATION (2 - Td or Tdap) 06/27/2024    ANNUAL REVIEW OF HM ORDERS  10/27/2024    FALL RISK ASSESSMENT  10/27/2024    MAMMO SCREENING  11/28/2024    COLORECTAL CANCER SCREENING  02/23/2026    ADVANCE CARE PLANNING  10/27/2028    HEPATITIS C SCREENING  Completed    PHQ-2 (once per calendar year)  Completed    INFLUENZA VACCINE  Completed    Pneumococcal Vaccine: 65+ Years  Completed    ZOSTER IMMUNIZATION  Completed    RSV VACCINE 60+  Completed    COVID-19 Vaccine  Completed    IPV IMMUNIZATION  Aged Out    HPV IMMUNIZATION  Aged Out    MENINGITIS IMMUNIZATION  Aged Out     BP Readings from Last 3 Encounters:   10/27/23 (!) 142/89   10/24/22  128/77   10/20/21 132/84    Wt Readings from Last 3 Encounters:   10/27/23 59 kg (130 lb 1.6 oz)   10/24/22 57.4 kg (126 lb 8 oz)   10/20/21 58.8 kg (129 lb 9.6 oz)                  Patient Active Problem List   Diagnosis    Osteopenia    Seasonal allergic rhinitis    Chronic constipation    Lumbar radiculopathy    Advanced directives, counseling/discussion    CARDIOVASCULAR SCREENING; LDL GOAL LESS THAN 130    Eczema, unspecified type     Past Surgical History:   Procedure Laterality Date    BACK SURGERY      epidural in 2016    COLONOSCOPY N/A 02/26/2016    Procedure: COLONOSCOPY;  Surgeon: Mar Frye MD;  Location: [facility] OR    COLONOSCOPY N/A 02/23/2021    Procedure: Colonoscopy, With Polypectomy And Biopsy;  Surgeon: Mely Crockett DO;  Location: [facility] OR    COLONOSCOPY WITH CO2 INSUFFLATION N/A 02/26/2016    Procedure: COLONOSCOPY WITH CO2 INSUFFLATION;  Surgeon: Mar Frye MD;  Location: [facility] OR    COLONOSCOPY WITH CO2 INSUFFLATION N/A 02/23/2021    Procedure: COLONOSCOPY, WITH CO2 INSUFFLATION;  Surgeon: Mely Crockett DO;  Location: [facility] OR    SHOULDER SURGERY  1985?    right shoulder, instability     TONSILLECTOMY & ADENOIDECTOMY  age 5    TUBAL LIGATION         Social History     Tobacco Use    Smoking status: Never    Smokeless tobacco: Never   Substance Use Topics    Alcohol use: Yes     Comment: 1 glass of wine 2-3 x's a week.     Family History   Problem Relation Age of Onset    Respiratory Mother         emphysema     Hypertension Mother     Asthma Mother     Thyroid Disease Mother     Heart Disease Father         cardiomyopathy, thought not heart attack.     Hypertension Father     Arthritis Brother         rheumatoid    Hyperlipidemia Brother     Arthritis Sister         osteoarthritis    Hypertension Sister     Hyperlipidemia Sister     Other Cancer Maternal Grandfather         cancer    Hypertension Sister     Hyperlipidemia Sister     Depression Sister     Anxiety Disorder Sister      Mental Illness Sister     Obesity Sister     Hyperlipidemia Brother     Anesthesia Reaction Other         Conscious sedation mefication during colonoscopy 15 years ago    Glaucoma No family hx of     Macular Degeneration No family hx of          Current Outpatient Medications   Medication Sig Dispense Refill    alendronate (FOSAMAX) 35 MG tablet Take 1 tablet (35 mg) by mouth every 7 days 12 tablet 3    atorvastatin (LIPITOR) 10 MG tablet Take 1 tablet (10 mg) by mouth daily 90 tablet 3    Cholecalciferol (VITAMIN D3 PO) Take by mouth daily      fexofenadine (ALLEGRA) 180 MG tablet Take 180 mg by mouth daily      fluticasone (FLONASE) 50 MCG/ACT nasal spray Spray 2 sprays into both nostrils as needed for rhinitis or allergies      MELATONIN PO Take by mouth nightly as needed      Multiple Vitamin (DAILY MULTIVITAMIN PO)            Mammogram Screening: Mammogram Screening: Recommended mammography every 1-2 years with patient discussion and risk factor consideration      Patient unknown to me presents for annual exam.  Had a skin lesion of right forearm treated with cryotherapy last year- and now has changed.   Will follow up with dermatology  Has Cataracts -eyes checked - doesn't drive at night - vision is 20/20 otherwise so no upcoming surgery   Seasonal allergies.  July had to go to urgent care and sinus infection.  Nagging cough - doesn't happen all the time  Bronchitis earlier in the month- mostly bothersome trying to go to sleep at night   Sleep getting better   Wake up coughing- tickle in her throat    Blood pressure high here but checks at home and 121/79 and monitors- not concerned about blood pressure - reports always has white coat hypertension.   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   HENT:  Negative for congestion,  "ear pain, hearing loss and sore throat.    Eyes:  Positive for visual disturbance. Negative for pain.   Respiratory:  Positive for cough. Negative for shortness of breath.    Cardiovascular:  Negative for chest pain, palpitations and peripheral edema.   Gastrointestinal:  Negative for abdominal pain, constipation, diarrhea, heartburn, hematochezia and nausea.   Breasts:  Negative for tenderness, breast mass and discharge.   Genitourinary:  Negative for dysuria, frequency, genital sores, hematuria, pelvic pain, urgency, vaginal bleeding and vaginal discharge.   Musculoskeletal:  Negative for arthralgias, joint swelling and myalgias.   Skin:  Negative for rash.   Neurological:  Negative for dizziness, weakness, headaches and paresthesias.   Psychiatric/Behavioral:  Negative for mood changes. The patient is not nervous/anxious.          OBJECTIVE:   BP (!) 142/89 (BP Location: Right arm, Patient Position: Sitting, Cuff Size: Adult Regular)   Pulse 77   Temp 98.2  F (36.8  C) (Oral)   Resp 19   Ht 1.63 m (5' 4.17\")   Wt 59 kg (130 lb 1.6 oz)   LMP  (LMP Unknown)   SpO2 99%   BMI 22.21 kg/m   Estimated body mass index is 21.71 kg/m  as calculated from the following:    Height as of 10/24/22: 1.626 m (5' 4\").    Weight as of 10/24/22: 57.4 kg (126 lb 8 oz).  Physical Exam  GENERAL: healthy, alert and no distress  EYES: Eyes grossly normal to inspection, PERRL and conjunctivae and sclerae normal  HENT: ear canals and TM's normal, nose and mouth without ulcers or lesions  NECK: no adenopathy, no asymmetry, masses, or scars and thyroid normal to palpation  RESP: lungs clear to auscultation - no rales, rhonchi or wheezes  BREAST: normal without masses, tenderness or nipple discharge and no palpable axillary masses or adenopathy  CV: regular rate and rhythm, normal S1 S2, no S3 or S4, no murmur, click or rub, no peripheral edema and peripheral pulses strong  ABDOMEN: soft, nontender, no hepatosplenomegaly, no masses " and bowel sounds normal  MS: no gross musculoskeletal defects noted, no edema  SKIN: no suspicious lesions or rashes  NEURO: Normal strength and tone, mentation intact and speech normal  PSYCH: mentation appears normal, affect normal/bright    Diagnostic Test Results:  Results for orders placed or performed in visit on 10/27/23   Lipid panel reflex to direct LDL Fasting     Status: Normal   Result Value Ref Range    Cholesterol 182 <200 mg/dL    Triglycerides 81 <150 mg/dL    Direct Measure HDL 71 >=50 mg/dL    LDL Cholesterol Calculated 95 <=100 mg/dL    Non HDL Cholesterol 111 <130 mg/dL    Narrative    Cholesterol  Desirable:  <200 mg/dL    Triglycerides  Normal:  Less than 150 mg/dL  Borderline High:  150-199 mg/dL  High:  200-499 mg/dL  Very High:  Greater than or equal to 500 mg/dL    Direct Measure HDL  Female:  Greater than or equal to 50 mg/dL   Male:  Greater than or equal to 40 mg/dL    LDL Cholesterol  Desirable:  <100mg/dL  Above Desirable:  100-129 mg/dL   Borderline High:  130-159 mg/dL   High:  160-189 mg/dL   Very High:  >= 190 mg/dL    Non HDL Cholesterol  Desirable:  130 mg/dL  Above Desirable:  130-159 mg/dL  Borderline High:  160-189 mg/dL  High:  190-219 mg/dL  Very High:  Greater than or equal to 220 mg/dL   Comprehensive metabolic panel (BMP + Alb, Alk Phos, ALT, AST, Total. Bili, TP)     Status: Abnormal   Result Value Ref Range    Sodium 139 135 - 145 mmol/L    Potassium 4.9 3.4 - 5.3 mmol/L    Carbon Dioxide (CO2) 27 22 - 29 mmol/L    Anion Gap 10 7 - 15 mmol/L    Urea Nitrogen 11.6 8.0 - 23.0 mg/dL    Creatinine 0.78 0.51 - 0.95 mg/dL    GFR Estimate 81 >60 mL/min/1.73m2    Calcium 10.2 8.8 - 10.2 mg/dL    Chloride 102 98 - 107 mmol/L    Glucose 103 (H) 70 - 99 mg/dL    Alkaline Phosphatase 71 35 - 104 U/L    AST 33 0 - 45 U/L    ALT 27 0 - 50 U/L    Protein Total 7.6 6.4 - 8.3 g/dL    Albumin 4.6 3.5 - 5.2 g/dL    Bilirubin Total 0.7 <=1.2 mg/dL       ASSESSMENT / PLAN:   (Z00.00)  Encounter for Medicare annual wellness exam  (primary encounter diagnosis)  Comment: benign exam except blood pressure and she notes white coat hypertension and blood pressure normotensive at home   Plan:     (Z00.00) Routine general medical examination at a health care facility  Comment:   Plan:     (M85.88) Other specified disorders of bone density and structure, other site  Comment: due for dexa in January   Plan: DX Hip/Pelvis/Spine            (M85.80) Osteopenia, unspecified location  Comment: on fosamax   Plan: alendronate (FOSAMAX) 35 MG tablet, DX         Hip/Pelvis/Spine            (E78.5) Hyperlipidemia LDL goal <130  Comment: LDL 95 on therapy -continue current therapy  Plan: atorvastatin (LIPITOR) 10 MG tablet            (Z29.11) Need for vaccination against respiratory syncytial virus  Comment:   Plan:     (Z13.1) Screening for diabetes mellitus  Comment:   Plan:     (Z12.31) Visit for screening mammogram  Comment:   Plan: *MA Screening Digital Bilateral                  COUNSELING:  Reviewed preventive health counseling, as reflected in patient instructions       Regular exercise       Healthy diet/nutrition       Vision screening       Immunizations  Declined: Covid-19, Influenza, TDAP, and Zoster due to Other got at pharmacy             Osteoporosis prevention/bone health       Colon cancer screening       (Marizol)menopause management        She reports that she has never smoked. She has never used smokeless tobacco.      Appropriate preventive services were discussed with this patient, including applicable screening as appropriate for fall prevention, nutrition, physical activity, Tobacco-use cessation, weight loss and cognition.  Checklist reviewing preventive services available has been given to the patient.    Reviewed patients plan of care and provided an AVS. The Basic Care Plan (routine screening as documented in Health Maintenance) for Елена meets the Care Plan requirement. This Care Plan  has been established and reviewed with the Patient.          Cecy Franks PA-C  [facility]    Identified Health Risks:

## 2023-10-27 NOTE — PATIENT INSTRUCTIONS
I will notify you of lab results via [facility]   Schedule your mammogram  Continue to monitor blood pressure and follow up with us if greater than 140/90   Schedule dexa scan on or after 1/4/24- make sure you check into insurance coverage- I have ordered it for osteopenia and other disorder of bone   Patient Education   Personalized Prevention Plan  You are due for the preventive services outlined below.  Your care team is available to assist you in scheduling these services.  If you have already completed any of these items, please share that information with your care team to update in your medical record.  Health Maintenance Due   Topic Date Due    Blood Sugar Test  10/20/2023    Annual Wellness Visit  10/24/2023        Preventive Health Recommendations    See your health care provider every year to  Review health changes.   Discuss preventive care.    Review your medicines if your doctor has prescribed any.    You no longer need a yearly Pap test unless you've had an abnormal Pap test in the past 10 years. If you have vaginal symptoms, such as bleeding or discharge, be sure to talk with your provider about a Pap test.    Every 1 to 2 years, have a mammogram.  If you are over 69, talk with your health care provider about whether or not you want to continue having screening mammograms.    Every 10 years, have a colonoscopy. Or, have a yearly FIT test (stool test). These exams will check for colon cancer.     Have a cholesterol test every 5 years, or more often if your doctor advises it.     Have a diabetes test (fasting glucose) every three years. If you are at risk for diabetes, you should have this test more often.     At age 65, have a bone density scan (DEXA) to check for osteoporosis (brittle bone disease).    Shots:  Get a flu shot each year.  Get a tetanus shot every 10 years.  Talk to your doctor about your pneumonia vaccines. There are now two you should receive - Pneumovax (PPSV 23) and Prevnar (PCV  13).  Talk to your pharmacist about the shingles vaccine.  Talk to your doctor about the hepatitis B vaccine.    Nutrition:   Eat at least 5 servings of fruits and vegetables each day.    Eat whole-grain bread, whole-wheat pasta and brown rice instead of white grains and rice.    Get adequate about Calcium and Vitamin D.     Lifestyle  Exercise at least 150 minutes a week (30 minutes a day, 5 days a week). This will help you control your weight and prevent disease.    Limit alcohol to one drink per day.    No smoking.     Wear sunscreen to prevent skin cancer.     See your dentist twice a year for an exam and cleaning.    See your eye doctor every 1 to 2 years to screen for conditions such as glaucoma, macular degeneration, cataracts, etc.    Personalized Prevention Plan  You are due for the preventive services outlined below.  Your care team is available to assist you in scheduling these services.  If you have already completed any of these items, please share that information with your care team to update in your medical record.    Health Maintenance Due   Topic Date Due    Blood Sugar Test  10/20/2023    Annual Wellness Visit  10/24/2023

## 2023-10-27 NOTE — RESULT ENCOUNTER NOTE
"Dear Enedelia  Your electrolytes, kidney function and liver function were normal.     Your blood sugar is borderline elevated.    This is in the \"prediabetes\" range.  You are not currently diabetic, but at risk for developing this disease in the future.   Exercise, weight loss,  and limiting carbohydrates and sugars in the diet can be helpful to avoid progression to diabetes.  At minimum we need to check your blood sugar annually.    Please be seen urgently if you develop any signs or symptoms of diabetes (increase thirst or urination, fatigue, blurry vision, unexplained weight loss).   Your cholesterol looks great on the medication.    I will send over refills.   Your vitamin D level is still pending.  Please call or MyChart my office with any questions or concerns.   Cecy Franks, PAC          "

## 2023-10-30 LAB
DEPRECATED CALCIDIOL+CALCIFEROL SERPL-MC: <63 UG/L (ref 20–75)
VITAMIN D2 SERPL-MCNC: <5 UG/L
VITAMIN D3 SERPL-MCNC: 58 UG/L

## 2023-10-30 NOTE — RESULT ENCOUNTER NOTE
Dear Enedelia  Your vitamin D level was normal.  Continue supplement as you have been taking.  Please call or MyChart my office with any questions or concerns.   Cecy Franks, PAC

## 2023-11-04 ENCOUNTER — MYC MEDICAL ADVICE (OUTPATIENT)
Dept: FAMILY MEDICINE | Facility: CLINIC | Age: 68
End: 2023-11-04
Payer: COMMERCIAL

## 2023-11-06 NOTE — TELEPHONE ENCOUNTER
Routing to provider to review and advise.  Would you like this patient to make an appointment?  Last seen 10/27.     Kristina Kjellberg, MSN, RN  [facility] Triage

## 2023-11-09 ENCOUNTER — VIRTUAL VISIT (OUTPATIENT)
Dept: FAMILY MEDICINE | Facility: CLINIC | Age: 68
End: 2023-11-09
Payer: COMMERCIAL

## 2023-11-09 DIAGNOSIS — R09.81 NASAL CONGESTION: Primary | ICD-10-CM

## 2023-11-09 PROCEDURE — 99213 OFFICE O/P EST LOW 20 MIN: CPT | Mod: 95 | Performed by: PHYSICIAN ASSISTANT

## 2023-11-09 RX ORDER — AZELASTINE 1 MG/ML
1 SPRAY, METERED NASAL 2 TIMES DAILY
Qty: 30 ML | Refills: 1 | Status: SHIPPED | OUTPATIENT
Start: 2023-11-09 | End: 2024-08-13

## 2023-11-09 NOTE — PROGRESS NOTES
Instructions Relayed to Patient by Virtual Roomer:         Reminded patient to ensure they were logged on to virtual visit by arrival time listed. Documented in appointment notes if patient had flexibility to initiate visit sooner than arrival time. If pediatric virtual visit, ensured pediatric patient along with parent/guardian will be present for video visit.     Patient offered the website [email or web address] and/or phone number to [facility] Help line: [phone]   Enedelia is a 68 year old who is being evaluated via a billable telephone visit.      What phone number would you like to be contacted at? [phone]   How would you like to obtain your AVS? [employer]    Distant Location (provider location):  Off-site    Assessment & Plan     Nasal congestion  Recurring over months Improves with sudafed.  Advised to discontinue sudafed. Flonase not particularly helpful.  Recurring symptoms for months.  Trial of astelin- consider oral steroid. Follow up with ENT  - azelastine (ASTELIN) 0.1 % nasal spray  Dispense: 30 mL; Refill: 1  - Adult ENT  Referral               Patient Instructions   Discontinue sudafed  Try astelin nasal spray  Follow up with ENT as soon as able   Message me Monday with how you are doing and if unimproved consider burst and taper of prednisone     SHERLY Syed [facility]   Enedelia is a 68 year old, presenting for the following health issues:  No chief complaint on file.      History of Present Illness       Reason for visit:  Reoccurring sinus infection  Symptom onset:  More than a month  Symptoms include:  Stuffy nose, plugged, ringing ears, thick mucus, yellow at times  Symptom intensity:  Moderate  Symptom progression:  Staying the same  Had these symptoms before:  Yes  Has tried/received treatment for these symptoms:  Yes  Previous treatment was successful:  Yes  Prior treatment description:  Antibiotic in  July  What makes it worse:  If i miss a dose of Sudafed  What makes it better:  Sudafed-12 hour.  Good for about 9 hours then my sinuses swell & i can t breathe    She eats 4 or more servings of fruits and vegetables daily.She consumes 0 sweetened beverage(s) daily.She exercises with enough effort to increase her heart rate 30 to 60 minutes per day.  She exercises with enough effort to increase her heart rate 4 days per week. She is missing 1 dose(s) of medications per week.  She is not taking prescribed medications regularly due to remembering to take.     Patient has been taking sudafed for nasal congestion. Today first day didn't take it whole it 9 days of symptoms.   Using flonase but doesn't clear nose. Doesn't feel like flonase really does much.  Has never tried astelin.  No recent antibiotic treatment.   No fever. In beginning had a a lot of sinus pain or pressure- sudafed works until 9 hours into it.   Pain and congestion have Changed sides.  Began on right side.  On right side for 5 days.  Congestion and after 5 days pain in left side.  Yesterday and today can breathe through both nostrils  Blosing nose like crazy.  Still coming out yellow  Has never had CT of sinuses.   Went to urgent care in July.  Every 6 weeks since then had a sinus infection of some magnitude- not all have lasted 9 or 10 days  Bronchitis 6 weeks ago and had bronchitis or recurring sinusitis with     Recurring events  9th day - still blowing nose crazy but feels over the worst   Steaming of face and ice of sinuses because plugged - all very plugged  Using Nasal rinses.  Doen everything can think of doing without improvement   Lot better yesterday and today  Usually takes sudafed in AM - but didn't take one today            Review of Systems   Constitutional, HEENT, cardiovascular, pulmonary, gi and gu systems are negative, except as otherwise noted.      Objective           Vitals:  No vitals were obtained today due to virtual  visit.    Physical Exam   healthy, alert, and no distress  PSYCH: Alert and oriented times 3; coherent speech, normal   rate and volume, able to articulate logical thoughts, able   to abstract reason, no tangential thoughts, no hallucinations   or delusions  Her affect is normal and pleasant  RESP: No cough, no audible wheezing, able to talk in full sentences  Remainder of exam unable to be completed due to telephone visits                Phone call duration: 12 minutes

## 2023-11-09 NOTE — PATIENT INSTRUCTIONS
Discontinue sudafed  Try astelin nasal spray  Follow up with ENT as soon as able   Message me Monday with how you are doing and if unimproved consider burst and taper of prednisone

## 2023-11-13 ENCOUNTER — MYC MEDICAL ADVICE (OUTPATIENT)
Dept: FAMILY MEDICINE | Facility: CLINIC | Age: 68
End: 2023-11-13
Payer: COMMERCIAL

## 2023-12-20 ENCOUNTER — ANCILLARY PROCEDURE (OUTPATIENT)
Dept: MAMMOGRAPHY | Facility: CLINIC | Age: 68
End: 2023-12-20
Attending: PHYSICIAN ASSISTANT
Payer: COMMERCIAL

## 2023-12-20 DIAGNOSIS — Z12.31 VISIT FOR SCREENING MAMMOGRAM: ICD-10-CM

## 2023-12-20 PROCEDURE — 77067 SCR MAMMO BI INCL CAD: CPT | Mod: GC | Performed by: RADIOLOGY

## 2023-12-20 PROCEDURE — 77063 BREAST TOMOSYNTHESIS BI: CPT | Mod: GC | Performed by: RADIOLOGY

## 2024-01-08 ENCOUNTER — ANCILLARY PROCEDURE (OUTPATIENT)
Dept: BONE DENSITY | Facility: CLINIC | Age: 69
End: 2024-01-08
Attending: PHYSICIAN ASSISTANT
Payer: COMMERCIAL

## 2024-01-08 ENCOUNTER — MYC MEDICAL ADVICE (OUTPATIENT)
Dept: FAMILY MEDICINE | Facility: CLINIC | Age: 69
End: 2024-01-08

## 2024-01-08 DIAGNOSIS — M85.80 OSTEOPENIA, UNSPECIFIED LOCATION: ICD-10-CM

## 2024-01-08 DIAGNOSIS — M85.88 OTHER SPECIFIED DISORDERS OF BONE DENSITY AND STRUCTURE, OTHER SITE: ICD-10-CM

## 2024-01-08 PROCEDURE — 77080 DXA BONE DENSITY AXIAL: CPT | Performed by: RADIOLOGY

## 2024-01-08 NOTE — RESULT ENCOUNTER NOTE
Dear Enedelia  The bone density test shows osteopenia. This is an intermediate category that is in between normal and osteoporosis.  People with osteopenia should work on taking in 2806-8097 mg of calcium with vitamin D daily. They should also be getting daily weight bearing exercise (walking works)   Please call or MyChart my office with any questions or concerns.   Cecy Franks, PAC

## 2024-01-08 NOTE — TELEPHONE ENCOUNTER
Routing to provider to review and advise.     Teresa Arora, ANGELON, RN   [facility]

## 2024-03-26 ENCOUNTER — OFFICE VISIT (OUTPATIENT)
Dept: OTOLARYNGOLOGY | Facility: CLINIC | Age: 69
End: 2024-03-26
Attending: PHYSICIAN ASSISTANT
Payer: COMMERCIAL

## 2024-03-26 DIAGNOSIS — R09.81 NASAL CONGESTION: ICD-10-CM

## 2024-03-26 DIAGNOSIS — J30.1 SEASONAL ALLERGIC RHINITIS DUE TO POLLEN: Primary | ICD-10-CM

## 2024-03-26 DIAGNOSIS — J34.3 NASAL TURBINATE HYPERTROPHY: ICD-10-CM

## 2024-03-26 PROCEDURE — 99203 OFFICE O/P NEW LOW 30 MIN: CPT | Performed by: OTOLARYNGOLOGY

## 2024-03-26 ASSESSMENT — ENCOUNTER SYMPTOMS
CONSTITUTIONAL NEGATIVE: 1
HEARTBURN: 0
RESPIRATORY NEGATIVE: 1
EYES NEGATIVE: 1
GASTROINTESTINAL NEGATIVE: 1
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
## 2024-03-26 NOTE — NURSING NOTE
Елена Mix's chief complaint for this visit includes:  Chief Complaint   Patient presents with    Consult     Nasal congestion Occasional. Has seasonal allergies. Has had 4 sinus infections from Past July to Oct. Bilateral, but switches from right to left.      PCP: Jana Ramirez    Referring Provider:  Cecy Franks PA-C  [address] [facility] [place] [address]    LMP  (LMP Unknown)           
Abdomen soft, no guarding, some RLQ tenderness.
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
## 2024-06-11 ENCOUNTER — OFFICE VISIT (OUTPATIENT)
Dept: OPHTHALMOLOGY | Facility: CLINIC | Age: 69
End: 2024-06-11
Payer: COMMERCIAL

## 2024-06-11 DIAGNOSIS — H25.13 NUCLEAR SCLEROTIC CATARACT OF BOTH EYES: Primary | ICD-10-CM

## 2024-06-11 DIAGNOSIS — H04.123 DRY EYES: ICD-10-CM

## 2024-06-11 DIAGNOSIS — H52.4 MYOPIA OF BOTH EYES WITH REGULAR ASTIGMATISM AND PRESBYOPIA: ICD-10-CM

## 2024-06-11 DIAGNOSIS — H52.13 MYOPIA OF BOTH EYES WITH REGULAR ASTIGMATISM AND PRESBYOPIA: ICD-10-CM

## 2024-06-11 DIAGNOSIS — H52.223 MYOPIA OF BOTH EYES WITH REGULAR ASTIGMATISM AND PRESBYOPIA: ICD-10-CM

## 2024-06-11 PROCEDURE — 92014 COMPRE OPH EXAM EST PT 1/>: CPT | Performed by: STUDENT IN AN ORGANIZED HEALTH CARE EDUCATION/TRAINING PROGRAM

## 2024-06-11 PROCEDURE — 92015 DETERMINE REFRACTIVE STATE: CPT | Performed by: STUDENT IN AN ORGANIZED HEALTH CARE EDUCATION/TRAINING PROGRAM

## 2024-06-11 ASSESSMENT — CONF VISUAL FIELD
OD_INFERIOR_TEMPORAL_RESTRICTION: 0
OD_NORMAL: 1
OS_NORMAL: 1
OD_SUPERIOR_TEMPORAL_RESTRICTION: 0
OD_INFERIOR_NASAL_RESTRICTION: 0
OS_SUPERIOR_TEMPORAL_RESTRICTION: 0
OS_SUPERIOR_NASAL_RESTRICTION: 0
OS_INFERIOR_NASAL_RESTRICTION: 0
OS_INFERIOR_TEMPORAL_RESTRICTION: 0
OD_SUPERIOR_NASAL_RESTRICTION: 0

## 2024-06-11 ASSESSMENT — EXTERNAL EXAM - LEFT EYE: OS_EXAM: NORMAL

## 2024-06-11 ASSESSMENT — VISUAL ACUITY
OD_SC: 20/25
OD_SC+: -3
OS_SC: 20/30-1
METHOD: SNELLEN - LINEAR
OS_SC+: +2

## 2024-06-11 ASSESSMENT — CUP TO DISC RATIO
OS_RATIO: 0.35
OD_RATIO: 0.35

## 2024-06-11 ASSESSMENT — REFRACTION_MANIFEST
OD_ADD: +2.75
OD_SPHERE: -0.75
OS_AXIS: 006
OD_AXIS: 165
OS_SPHERE: -0.50
OS_CYLINDER: +0.75
OS_ADD: +2.75
OD_CYLINDER: +1.25

## 2024-06-11 ASSESSMENT — TONOMETRY
OS_IOP_MMHG: 16
OD_IOP_MMHG: 16
IOP_METHOD: APPLANATION

## 2024-06-11 ASSESSMENT — SLIT LAMP EXAM - LIDS
COMMENTS: NORMAL
COMMENTS: NORMAL

## 2024-06-11 ASSESSMENT — EXTERNAL EXAM - RIGHT EYE: OD_EXAM: NORMAL

## 2024-06-11 NOTE — LETTER
6/11/2024      Елена Mix  [address]  [facility] [address]      Dear Colleague,    Thank you for referring your patient, Елена Mix, to the [facility]. Please see a copy of my visit note below.     Current Eye Medications: None     Subjective: Here for complete eye exam. Patient complains of vision gradually worsening at distance and near since last visit. Patient has been wearing OTC reading glasses for near work. Some trouble seeing while driving at night due to slightly foggy vision and sensitivity to oncoming traffics headlights. No eye pain or discomfort.      Objective:  See Ophthalmology Exam.      Assessment:  Елена Mix is a 68 year old female who presents with:   Encounter Diagnoses   Name Primary?     Nuclear sclerotic cataract of both eyes Yes     Dry eyes      Myopia of both eyes with regular astigmatism and presbyopia        Plan:  Glasses prescription given    Juno Waller MD  [phone]     Again, thank you for allowing me to participate in the care of your patient.        Sincerely,        Juno Waller MD

## 2024-06-11 NOTE — PROGRESS NOTES
Current Eye Medications: None     Subjective: Here for complete eye exam. Patient complains of vision gradually worsening at distance and near since last visit. Patient has been wearing OTC reading glasses for near work. Some trouble seeing while driving at night due to slightly foggy vision and sensitivity to oncoming traffics headlights. No eye pain or discomfort.      Objective:  See Ophthalmology Exam.      Assessment:  Елена Mix is a 68 year old female who presents with:   Encounter Diagnoses   Name Primary?    Nuclear sclerotic cataract of both eyes Yes    Dry eyes     Myopia of both eyes with regular astigmatism and presbyopia        Plan:  Glasses prescription given    Juno Waller MD  [phone]

## 2024-07-01 NOTE — PROGRESS NOTES
"Answers for HPI/ROS submitted by the patient on 10/23/2022  In general, how would you rate your overall physical health?: good  Frequency of exercise:: 4-5 days/week  Do you usually eat at least 4 servings of fruit and vegetables a day, include whole grains & fiber, and avoid regularly eating high fat or \"junk\" foods? : Yes  Taking medications regularly:: Yes  Medication side effects:: None  Activities of Daily Living: no assistance needed  Home safety: no safety concerns identified  Hearing Impairment:: no hearing concerns  In the past 6 months, have you been bothered by leaking of urine?: No  abdominal pain: No  Blood in stool: No  Blood in urine: No  chest pain: No  chills: No  congestion: No  constipation: Yes  cough: No  diarrhea: No  dizziness: No  ear pain: No  eye pain: No  nervous/anxious: Yes  fever: No  frequency: No  genital sores: No  headaches: No  hearing loss: No  heartburn: No  arthralgias: Yes  joint swelling: No  peripheral edema: No  mood changes: No  myalgias: No  nausea: No  dysuria: No  palpitations: No  Skin sensation changes: No  sore throat: No  urgency: No  rash: No  shortness of breath: No  visual disturbance: Yes  pelvic pain: No  vaginal bleeding: No  vaginal discharge: No  tenderness: No  breast mass: No  breast discharge: No  In general, how would you rate your overall mental or emotional health?: good  Additional concerns today:: Yes  Duration of exercise:: 30-45 minutes    SUBJECTIVE:   Enedelia is a 67 year old who presents for Preventive Visit.      Patient has been advised of split billing requirements and indicates understanding: Yes  Are you in the first 12 months of your Medicare coverage?  No    Healthy Habits:     In general, how would you rate your overall health?  Good    Frequency of exercise:  4-5 days/week    Duration of exercise:  30-45 minutes    Do you usually eat at least 4 servings of fruit and vegetables a day, include whole grains    & fiber and avoid regularly " -- DO NOT REPLY / DO NOT REPLY ALL --  -- This inbox is not monitored  -- Message is from [facility] ([facility]) --      Message Type:  Refill Medication   Refill request for medication named:       3 medications  polyethylene glycol (MIRALAX) 17 GM/SCOOP powder    &  omeprazole (PriLOSEC) 40 MG capsule () & ear drops carbamide peroxide (Debrox) 6.5 % otic solution     Medication not pended: Medication is not on current med list.  Preferred pharmacy verified and selected.     [id number] - [place], [place] - [address] [place] [address] RD AT [facility] & [facility] [address]  [address] [facility] [address]  Phone: [phone] Fax: [phone]      Is the patient OUT of Medication?  Yes and Medication Refills handled by [facility]        Message: 3 med refill request pt forgot to ask for at time of appt                   "eating high fat or \"junk\" foods?  Yes    Taking medications regularly:  Yes    Medication side effects:  None    Ability to successfully perform activities of daily living:  No assistance needed    Home Safety:  No safety concerns identified    Hearing Impairment:  No hearing concerns    In the past 6 months, have you been bothered by leaking of urine?  No    In general, how would you rate your overall mental or emotional health?  Good      PHQ-2 Total Score: 1    Additional concerns today:  Yes    Do you feel safe in your environment? Yes    Have you ever done Advance Care Planning? (For example, a Health Directive, POLST, or a discussion with a medical provider or your loved ones about your wishes): Yes, patient states has an Advance Care Planning document and will bring a copy to the clinic.      Fall risk  Fallen 2 or more times in the past year?: No  Any fall with injury in the past year?: No    Cognitive Screening   1) Repeat 3 items (Leader, Season, Table)    2) Clock draw: NORMAL  3) 3 item recall: Recalls 3 objects  Results: 3 items recalled: COGNITIVE IMPAIRMENT LESS LIKELY    Mini-CogTM Copyright [employer]. Licensed by the author for use in [facility]; reprinted with permission (pramod@[facility]). All rights reserved.          Reviewed and updated as needed this visit by clinical staff   Tobacco  Allergies  Meds   Med Hx  Surg Hx  Fam Hx  Soc Hx        Reviewed and updated as needed this visit by Provider                 Social History     Tobacco Use     Smoking status: Never     Smokeless tobacco: Never   Substance Use Topics     Alcohol use: Yes     Comment: 1 glass of wine 2-3 x's a week.         Alcohol Use 10/23/2022   Prescreen: >3 drinks/day or >7 drinks/week? No   Prescreen: >3 drinks/day or >7 drinks/week? -   AUDIT SCORE  -           Current providers sharing in care for this patient include:   Patient Care Team:  Jana Ramirez MD as PCP - General (Family " Practice)  Jana Ramirez MD as Assigned PCP  Juno Waller MD as Assigned Surgical Provider    The following health maintenance items are reviewed in Epic and correct as of today:  Health Maintenance   Topic Date Due     HEPATITIS B IMMUNIZATION (1 of 3 - 3-dose series) Never done     MEDICARE ANNUAL WELLNESS VISIT  10/20/2022     ANNUAL REVIEW OF HM ORDERS  10/03/2023     LIPID  10/20/2023     GLUCOSE  10/20/2023     FALL RISK ASSESSMENT  10/24/2023     MAMMO SCREENING  11/24/2023     DEXA  01/03/2024     DTAP/TDAP/TD IMMUNIZATION (3 - Td or Tdap) 06/27/2024     COLORECTAL CANCER SCREENING  02/23/2026     ADVANCE CARE PLANNING  10/20/2026     HEPATITIS C SCREENING  Completed     PHQ-2 (once per calendar year)  Completed     INFLUENZA VACCINE  Completed     Pneumococcal Vaccine: 65+ Years  Completed     ZOSTER IMMUNIZATION  Completed     COVID-19 Vaccine  Completed     IPV IMMUNIZATION  Aged Out     MENINGITIS IMMUNIZATION  Aged Out         Breast CA Risk Assessment (FHS-7) 10/17/2021   Do you have a family history of breast, colon, or ovarian cancer? No / Unknown         Mammogram Screening: Recommended annual mammography  Pertinent mammograms are reviewed under the imaging tab.    Review of Systems   Constitutional: Negative for chills and fever.   HENT: Negative for congestion, ear pain, hearing loss and sore throat.    Eyes: Positive for visual disturbance. Negative for pain.   Respiratory: Negative for cough and shortness of breath.    Cardiovascular: Negative for chest pain, palpitations and peripheral edema.   Gastrointestinal: Positive for constipation. Negative for abdominal pain, diarrhea, heartburn, hematochezia and nausea.   Breasts:  Negative for tenderness, breast mass and discharge.   Genitourinary: Negative for dysuria, frequency, genital sores, hematuria, pelvic pain, urgency, vaginal bleeding and vaginal discharge.   Musculoskeletal: Positive for arthralgias. Negative for joint  "swelling and myalgias.   Skin: Negative for rash.   Neurological: Negative for dizziness, headaches and paresthesias.   Psychiatric/Behavioral: Negative for mood changes. The patient is nervous/anxious.      Melatonin is not working as well now.   Only sleeping 4-5 hours per night.   Following sleep hygiene   No caffeine after 10 am.   Fall asleep easily but then wake and can't fall back to sleep for up to two hours.   Stress is up. Started seeing therapist 3 months ago and that has helped. Family issues. Anxiety is keeping her up    Ipratropium nasal spray only used prn. flonase and allegra as needed    Vit D was very high so held - was taking 5,000 international unit(s) per day.  Now restarted back on vit D supplemenation since level is normal now. Will plan for 1,000 international unit(s) daily.     Osteopenia- fosomax going well. If don't drink adequate amt of water will get gerd. If drink enough no AE.     The 10-year ASCVD risk score (Alex BETTENCOURT, et al., 2019) is: 11.2%    Values used to calculate the score:      Age: 67 years      Sex: Female      Is Non- : No      Diabetic: No      Tobacco smoker: No      Systolic Blood Pressure: 147 mmHg      Is BP treated: No      HDL Cholesterol: 70 mg/dL      Total Cholesterol: 244 mg/dL       Home bp's 120's/70's    Some vulvar dryness.       OBJECTIVE:   BP (!) 152/79 (BP Location: Right arm, Patient Position: Sitting, Cuff Size: Adult Regular)   Pulse 86   Temp 98.3  F (36.8  C) (Oral)   Resp 15   Ht 1.626 m (5' 4\")   Wt 57.4 kg (126 lb 8 oz)   LMP  (LMP Unknown)   SpO2 99%   BMI 21.71 kg/m   Estimated body mass index is 21.71 kg/m  as calculated from the following:    Height as of this encounter: 1.626 m (5' 4\").    Weight as of this encounter: 57.4 kg (126 lb 8 oz).  Physical Exam  GENERAL: healthy, alert and no distress  EYES: Eyes grossly normal to inspection, PERRL and conjunctivae and sclerae normal  HENT: ear canals and TM's " normal, nose and mouth without ulcers or lesions  NECK: no adenopathy, no asymmetry, masses, or scars and thyroid normal to palpation  RESP: lungs clear to auscultation - no rales, rhonchi or wheezes  BREAST: normal without masses, tenderness or nipple discharge and no palpable axillary masses or adenopathy  CV: regular rate and rhythm, normal S1 S2, no S3 or S4, no murmur, click or rub, no peripheral edema and peripheral pulses strong  ABDOMEN: soft, nontender, no hepatosplenomegaly, no masses and bowel sounds normal   (female): normal female external genitalia, normal urethral meatus, vaginal mucosa pink, moist, well rugated, and normal cervix/adnexa/uterus without masses or discharge  MS: no gross musculoskeletal defects noted, no edema  SKIN: no rashes. Left forearm with scaling small red patch less than 1 cm  Cryotherapy procedure note: After verbal consent and discussion of risks and benefits including but not limited to dyspigmentation/scar, blister, infection, recurrence, the  Lesion is  treated with 1-2mm freeze border for 3 cycles with liquid nitrogen.  NEURO: Normal strength and tone, mentation intact and speech normal  PSYCH: mentation appears normal, affect normal/bright    Diagnostic Test Results:  Labs reviewed in Epic    Component      Latest Ref Rng & Units 10/20/2022 10/20/2022           7:41 AM  7:41 AM   Sodium      133 - 144 mmol/L     Potassium      3.4 - 5.3 mmol/L     Chloride      94 - 109 mmol/L     Carbon Dioxide      20 - 32 mmol/L     Anion Gap      3 - 14 mmol/L     Urea Nitrogen      7 - 30 mg/dL     Creatinine      0.52 - 1.04 mg/dL     Calcium      8.5 - 10.1 mg/dL     Glucose      70 - 99 mg/dL 102 (H)    GFR Estimate      >60 mL/min/1.73m2     Cholesterol      <200 mg/dL  244 (H)   Triglycerides      <150 mg/dL  112   HDL Cholesterol      >=50 mg/dL  70   LDL Cholesterol Calculated      <=100 mg/dL  152 (H)   Non HDL Cholesterol      <130 mg/dL  174 (H)   Patient Fasting >  8hrs?       Yes Yes   Vitamin D Deficiency screening      20 - 75 ug/L 62    Parathyroid Hormone Intact      18 - 80 pg/mL     TSH      0.40 - 4.00 mU/L     Sed Rate      0 - 30 mm/hr     CRP Inflammation      0.0 - 8.0 mg/L     LARS interpretation      Negative       ASSESSMENT / PLAN:   (Z00.00) Encounter for Medicare annual wellness exam  (primary encounter diagnosis)      (G47.00) Insomnia, unspecified type  Comment: Many situational stressors going on currently and likely contributing to her anxiety.  Plan: traZODone (DESYREL) 50 MG tablet        We reviewed option of starting an antidepressant for anxiety management or starting trazodone more for sleep aid and she prefers the latter.  Reviewed onset of action of meds, common side effects and plan for close f/u. Encouraged call to clinic if symptoms worsening or adverse reactions.     (K59.09) Chronic constipation  Comment: Overall this has been controlled with her fiber and diet  Plan: Monitor and follow-up as needed    (E78.5) Hyperlipidemia LDL goal <130  Comment: Discussed for her cardiovascular risk is she is certainly a candidate for statin medication.  She is very motivated to not start a statin medication and would like to get back to more intensive exercise and dietary efforts  Plan: Lifestyle measures will be trialed and follow-up testing in 3 to 6 months plan    (M85.80) Osteopenia, unspecified location  Comment: Tolerating Fosamax as long as she drinks enough water.  Reports rare reflux from the medication when she does not.  Plan: alendronate (FOSAMAX) 35 MG tablet        Reviewed the importance of flushing this medication through the system and the need to hold medication if recurrent reflux from the med.  We will plan for bone density scan every 2 years.  RA mean to treat about 3 to 5 years with the Fosamax and currently she has been on for over 1 year    (R03.0) Elevated blood pressure reading without diagnosis of hypertension  Comment: Initial  "BP today was quite elevated but did come down on rest  Plan: Continue to monitor home BP and follow-up if recurrently elevated    (N95.2) Atrophic vaginitis  Comment: Noting some vaginal dryness and is interested in trial of topical vaginal estrogen  Plan: : estradiol (ESTRACE) 0.1 MG/GM         vaginal cream        Reviewed medication and proper use.    (Z13.220) Screening cholesterol level  Comment:   Plan: Lipid panel reflex to direct LDL Fasting            (L98.9) Skin lesion  Comment: Left forearm, likely AK but cannot fully rule out SCC  Plan: This lesion is frozen today with liquid nitrogen.  Recommended follow-up with dermatology if the lesion does not resolve after treatment as further biopsy would be warranted          COUNSELING:  Reviewed preventive health counseling, as reflected in patient instructions       Regular exercise       Healthy diet/nutrition       Vision screening       Osteoporosis prevention/bone health    Estimated body mass index is 21.71 kg/m  as calculated from the following:    Height as of this encounter: 1.626 m (5' 4\").    Weight as of this encounter: 57.4 kg (126 lb 8 oz).        She reports that she has never smoked. She has never used smokeless tobacco.      Appropriate preventive services were discussed with this patient, including applicable screening as appropriate for cardiovascular disease, diabetes, osteopenia/osteoporosis, and glaucoma.  As appropriate for age/gender, discussed screening for colorectal cancer, prostate cancer, breast cancer, and cervical cancer. Checklist reviewing preventive services available has been given to the patient.    Reviewed patients plan of care and provided an AVS. The Basic Care Plan (routine screening as documented in Health Maintenance) for Елена meets the Care Plan requirement. This Care Plan has been established and reviewed with the Patient.    Counseling Resources:  ATP IV Guidelines  Pooled Cohorts Equation Calculator  Breast " Cancer Risk Calculator  Breast Cancer: Medication to Reduce Risk  FRAX Risk Assessment  ICSI Preventive Guidelines  Dietary Guidelines for Americans, 2010  [employer] MyPlate  ASA Prophylaxis  Lung CA Screening    Jana Ramirez MD  [facility]    Identified Health Risks:

## 2024-08-13 ENCOUNTER — OFFICE VISIT (OUTPATIENT)
Dept: ALLERGY | Facility: CLINIC | Age: 69
End: 2024-08-13
Payer: COMMERCIAL

## 2024-08-13 VITALS — DIASTOLIC BLOOD PRESSURE: 79 MMHG | SYSTOLIC BLOOD PRESSURE: 130 MMHG | HEART RATE: 73 BPM | OXYGEN SATURATION: 99 %

## 2024-08-13 DIAGNOSIS — J31.0 RHINOCONJUNCTIVITIS: Primary | ICD-10-CM

## 2024-08-13 DIAGNOSIS — H10.9 RHINOCONJUNCTIVITIS: Primary | ICD-10-CM

## 2024-08-13 PROCEDURE — 99204 OFFICE O/P NEW MOD 45 MIN: CPT | Mod: GC | Performed by: ALLERGY & IMMUNOLOGY

## 2024-08-13 NOTE — PATIENT INSTRUCTIONS
If you have any questions regarding your allergies, asthma, or what we discussed during your visit today please call the allergy clinic or contact us via [employer]    [facility] Allergy RN Line: [phone] - call this number with any questions during or after business/clinic hours  [facility] Allergy Scheduling - Adult Patients: [phone]  [facility] Allergy Scheduling - Pediatric Patients: [phone]    All visits for food challenges, medication/drug allergy testing, and drug challenges MUST be scheduled through the allergy clinic nurse. Please call the nurse at [phone] or send a [employer] message for scheduling. Appointments for these visits that are made through the schedulers or via [employer] may be cancelled or rescheduled.    Clinic Schedule:   [place] - Monday, Tuesday, and Thursday  [address] [place] [address]    [facility] - Wednesday  [address] [place] [address]      Ok to increase Allegra (fexofenadine) up to 360mg (2 tablets) twice a day    Try a water-based nasal steroid spray such as Nasacort (triamcinolone) or Nasonex (mometasone) - 2 sprays in each nostril daily    Use the azelastine (Astepro) nasal spray once a day as needed    Try Pataday eye drops once daily as needed    Use a preservative-free lubricating eye drop throughout the day as needed - Systane and Refresh are good brands

## 2024-08-13 NOTE — PROGRESS NOTES
Елена Mix was seen in the Allergy Clinic at [facility].    Елена Mix is a 68 year old White female being seen today at the request of Dr. Haji in consultation for allergies.    She has been complaining about increased nasal congestion over the last year.  Last year she was treated for 4 episodes of sinus infection and after seeing ENT and had a normal exam, ENT recommended allergy follow-up.  She currently uses Allegra daily and Flonase for the last 7 months.  ENT recommended using Flonase twice a day and azelastine but she stopped after a week due to dryness in the nose and mouth.  She also complains about itchy eyes that improved with Allegra.  She will occasionally use a lubricating drop as well.    Enedelia has had a long history of seasonal allergies and has received allergy testing in the past. Reports she was found to be positive for ragweed and received allergy shots which had to be stopped after 6 months due to reaction. Records of testing and results are not available for review today.    Past Medical History:   Diagnosis Date    Depressive disorder January, 2022    Seeing a therapist    Nonsenile cataract      Family History   Problem Relation Age of Onset    Respiratory Mother         emphysema     Hypertension Mother     Asthma Mother     Thyroid Disease Mother     Heart Disease Father         cardiomyopathy, thought not heart attack.     Hypertension Father     Arthritis Brother         rheumatoid    Hyperlipidemia Brother     Arthritis Sister         osteoarthritis    Hypertension Sister     Hyperlipidemia Sister     Other Cancer Maternal Grandfather         cancer    Hypertension Sister     Hyperlipidemia Sister     Depression Sister     Anxiety Disorder Sister     Mental Illness Sister     Obesity Sister     Hyperlipidemia Brother     Anesthesia Reaction Other         Conscious sedation mefication during colonoscopy 15 years ago    Glaucoma No family hx of      Macular Degeneration No family hx of      Past Surgical History:   Procedure Laterality Date    BACK SURGERY      epidural in 2016    COLONOSCOPY N/A 02/26/2016    Procedure: COLONOSCOPY;  Surgeon: Mar Frye MD;  Location: [facility] OR    COLONOSCOPY N/A 02/23/2021    Procedure: Colonoscopy, With Polypectomy And Biopsy;  Surgeon: Mely Crockett DO;  Location: [facility]    COLONOSCOPY WITH CO2 INSUFFLATION N/A 02/26/2016    Procedure: COLONOSCOPY WITH CO2 INSUFFLATION;  Surgeon: Mar Frye MD;  Location: [facility]    COLONOSCOPY WITH CO2 INSUFFLATION N/A 02/23/2021    Procedure: COLONOSCOPY, WITH CO2 INSUFFLATION;  Surgeon: Mely Crockett DO;  Location: [facility] OR    SHOULDER SURGERY  1985?    right shoulder, instability     TONSILLECTOMY & ADENOIDECTOMY  age 5    TUBAL LIGATION         ENVIRONMENTAL HISTORY:   Елена [place] The home is heated with a forced air. They do have central air conditioning. The patient's bedroom is furnished with carpeting in bedroom and fabric window coverings.  Pets inside the house include 1 dog(s). There is no history of cockroach or mice infestation. Do you smoke cigarettes or other recreational drugs? No Do you vape or use an e-cigarette? No. There is/are 0 smokers living in the house. There is/are 0 who smoke ecigarettes/vape living in the house. The house does not have a damp basement.     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 She lives alone.        Current Outpatient Medications:     alendronate (FOSAMAX) 35 MG tablet, Take 1 tablet (35 mg) by mouth every 7 days, Disp: 12 tablet, Rfl: 3    atorvastatin (LIPITOR) 10 MG tablet, Take 1 tablet (10 mg) by mouth daily, Disp: 90 tablet, Rfl: 3    fexofenadine (ALLEGRA) 180 MG tablet, Take 180 mg by mouth daily, Disp: , Rfl:     fluticasone (FLONASE) 50 MCG/ACT nasal spray, Spray 2 sprays into both nostrils as needed for rhinitis or allergies, Disp: , Rfl:      Multiple Vitamin (DAILY MULTIVITAMIN PO), , Disp: , Rfl:   Immunization History   Administered Date(s) Administered    COVID-19 12+ (2023-24) (MODERNA) 10/08/2023    COVID-19 Bivalent 18+ (Moderna) 10/05/2022    COVID-19 MONOVALENT 12+ (Pfizer) 02/11/2021, 03/05/2021, 10/06/2021    COVID-19 Monovalent 12+ (Pfizer 2022) 05/02/2022    Flu, Unspecified 09/02/2016    Influenza (High Dose) 3 valent vaccine 10/01/2019    Influenza (intradermal) 08/22/2018    Influenza Intranasal Vaccine 09/01/2015    Influenza Vaccine 65+ (FLUAD) 09/21/2021, 09/30/2023    Influenza Vaccine 65+ (Fluzone HD) 08/31/2020, 09/25/2022    Influenza Vaccine >6 months,quad, PF 09/02/2016    Nasal Influenza Vaccine 2-49 (FluMist) 09/01/2015    Pneumo Conj 13-V (2010&after) 07/22/2019    Pneumococcal 23 valent 08/31/2020    RSV Vaccine (Arexvy) 10/08/2023    TD,PF 7+ (Tenivac) 11/23/2004    TDAP Vaccine (Adacel) 06/27/2014    Td (Adult), Adsorbed 11/23/2004    Zoster recombinant adjuvanted (SHINGRIX) 05/04/2018, 07/10/2019    Zoster vaccine, live 06/27/2014     No Known Allergies        EXAM:   /79 (BP Location: Right arm, Patient Position: Sitting, Cuff Size: Adult Regular)   Pulse 73   LMP  (LMP Unknown)   SpO2 99%   Physical Exam  Constitutional:       Appearance: Normal appearance.   HENT:      Nose: Nose normal.      Mouth/Throat:      Mouth: Mucous membranes are moist.      Pharynx: Oropharynx is clear.   Eyes:      Conjunctiva/sclera: Conjunctivae normal.   Cardiovascular:      Rate and Rhythm: Normal rate and regular rhythm.   Pulmonary:      Effort: Pulmonary effort is normal. No respiratory distress.      Breath sounds: Normal breath sounds. No wheezing.   Skin:     General: Skin is warm.   Neurological:      Mental Status: She is alert.   Psychiatric:         Mood and Affect: Mood normal.         Behavior: Behavior normal.           WORKUP: None    ASSESSMENT/PLAN:  Елена Mix is a 68 year old female here for  evaluation of possible allergies.    1. Rhinoconjunctivitis - She reports worsening nasal congestion and rhinitis symptoms over the past year. Symptoms have led to several sinus infections in the past year that were treated with antibiotics. Wants to know if there is an underlying allergic trigger for her symptoms. She has had prior allergy testing and completed a short course of immunotherapy. Discussed management of allergic rhinoconjunctivitis and advised that if symptoms are persisting despite her current medications immunotherapy would be recommended. She is not interested in proceeding with allergy testing or pursuing immunotherapy at this time.    - continue with fexofenadine 180mg daily - may increase to twice daily if needed  - discontinue fluticasone and recommend trial of water based nasal steroid such as triamcinolone or mometasone - 2 sprays in each nostril daily  - add azelastine nasal spray for persistent symptoms - 1 to 2 sprays in each nostril daily  - use olopatadine eye drops daily as needed  - recommend use of preservative-free lubricating eye drops for comfort as needed      Follow-up in as needed      Staffed with Dr. Lencho Mcdonald DO  Pediatric Resident PGY-3  [facility]       This service has been performed in part by a resident under the direction of a teaching physician. I have personally examined this patient and was present for the resident's conversation with this patient.  I agree with the resident's documentation and plan of care.  I have reviewed and amended the note above.  The documentation accurately reflects my clinical observations, diagnoses, treatment and follow-up plans.       Nate Musa MD, FAAAAI  Allergy/Immunology  [facility]    Consent was obtained from the patient to use an AI documentation tool in the creation of this note.    Chart documentation done in part  with Dragon Voice Recognition Software. Although reviewed after completion, some word and grammatical errors may remain.

## 2024-08-13 NOTE — LETTER
8/13/2024      Елена Mix  [address]  [place] [address]      Dear Colleague,    Thank you for referring your patient, Елена Mix, to the [facility]. Please see a copy of my visit note below.    Елена Mix was seen in the Allergy Clinic at [facility].    Елена Mix is a 68 year old White female being seen today at the request of Dr. Haji in consultation for allergies.    She has been complaining about increased nasal congestion over the last year.  Last year she was treated for 4 episodes of sinus infection and after seeing ENT and had a normal exam, ENT recommended allergy follow-up.  She currently uses Allegra daily and Flonase for the last 7 months.  ENT recommended using Flonase twice a day and azelastine but she stopped after a week due to dryness in the nose and mouth.  She also complains about itchy eyes that improved with Allegra.  She will occasionally use a lubricating drop as well.    Enedelia has had a long history of seasonal allergies and has received allergy testing in the past. Reports she was found to be positive for ragweed and received allergy shots which had to be stopped after 6 months due to reaction. Records of testing and results are not available for review today.    Past Medical History:   Diagnosis Date     Depressive disorder January, 2022    Seeing a therapist     Nonsenile cataract      Family History   Problem Relation Age of Onset     Respiratory Mother         emphysema      Hypertension Mother      Asthma Mother      Thyroid Disease Mother      Heart Disease Father         cardiomyopathy, thought not heart attack.      Hypertension Father      Arthritis Brother         rheumatoid     Hyperlipidemia Brother      Arthritis Sister         osteoarthritis     Hypertension Sister      Hyperlipidemia Sister      Other Cancer Maternal Grandfather         cancer     Hypertension Sister       Hyperlipidemia Sister      Depression Sister      Anxiety Disorder Sister      Mental Illness Sister      Obesity Sister      Hyperlipidemia Brother      Anesthesia Reaction Other         Conscious sedation mefication during colonoscopy 15 years ago     Glaucoma No family hx of      Macular Degeneration No family hx of      Past Surgical History:   Procedure Laterality Date     BACK SURGERY      epidural in 2016     COLONOSCOPY N/A 02/26/2016    Procedure: COLONOSCOPY;  Surgeon: Mar Frye MD;  Location: [facility]     COLONOSCOPY N/A 02/23/2021    Procedure: Colonoscopy, With Polypectomy And Biopsy;  Surgeon: Mely Crockett DO;  Location: [facility]     COLONOSCOPY WITH CO2 INSUFFLATION N/A 02/26/2016    Procedure: COLONOSCOPY WITH CO2 INSUFFLATION;  Surgeon: Mar Frye MD;  Location: [facility]     COLONOSCOPY WITH CO2 INSUFFLATION N/A 02/23/2021    Procedure: COLONOSCOPY, WITH CO2 INSUFFLATION;  Surgeon: Mely Crockett DO;  Location: [facility]     SHOULDER SURGERY  1985?    right shoulder, instability      TONSILLECTOMY & ADENOIDECTOMY  age 5     TUBAL LIGATION         ENVIRONMENTAL HISTORY:   [place]лена [place] The home is heated with a forced air. They do have central air conditioning. The patient's bedroom is furnished with carpeting in bedroom and fabric window coverings.  Pets inside the house include 1 dog(s). There is no history of cockroach or mice infestation. Do you smoke cigarettes or other recreational drugs? No Do you vape or use an e-cigarette? No. There is/are 0 smokers living in the house. There is/are 0 who smoke ecigarettes/vape living in the house. The house does not have a damp basement.     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 She lives alone.        Current Outpatient Medications:      alendronate (FOSAMAX) 35 MG tablet, Take 1 tablet (35 mg) by mouth every 7 days, Disp: 12 tablet, Rfl: 3     atorvastatin (LIPITOR)  10 MG tablet, Take 1 tablet (10 mg) by mouth daily, Disp: 90 tablet, Rfl: 3     fexofenadine (ALLEGRA) 180 MG tablet, Take 180 mg by mouth daily, Disp: , Rfl:      fluticasone (FLONASE) 50 MCG/ACT nasal spray, Spray 2 sprays into both nostrils as needed for rhinitis or allergies, Disp: , Rfl:      Multiple Vitamin (DAILY MULTIVITAMIN PO), , Disp: , Rfl:   Immunization History   Administered Date(s) Administered     COVID-19 12+ (2023-24) (MODERNA) 10/08/2023     COVID-19 Bivalent 18+ (Moderna) 10/05/2022     COVID-19 MONOVALENT 12+ (Pfizer) 02/11/2021, 03/05/2021, 10/06/2021     COVID-19 Monovalent 12+ (Pfizer 2022) 05/02/2022     Flu, Unspecified 09/02/2016     Influenza (High Dose) 3 valent vaccine 10/01/2019     Influenza (intradermal) 08/22/2018     Influenza Intranasal Vaccine 09/01/2015     Influenza Vaccine 65+ (FLUAD) 09/21/2021, 09/30/2023     Influenza Vaccine 65+ (Fluzone HD) 08/31/2020, 09/25/2022     Influenza Vaccine >6 months,quad, PF 09/02/2016     Nasal Influenza Vaccine 2-49 (FluMist) 09/01/2015     Pneumo Conj 13-V (2010&after) 07/22/2019     Pneumococcal 23 valent 08/31/2020     RSV Vaccine (Arexvy) 10/08/2023     TD,PF 7+ (Tenivac) 11/23/2004     TDAP Vaccine (Adacel) 06/27/2014     Td (Adult), Adsorbed 11/23/2004     Zoster recombinant adjuvanted (SHINGRIX) 05/04/2018, 07/10/2019     Zoster vaccine, live 06/27/2014     No Known Allergies        EXAM:   /79 (BP Location: Right arm, Patient Position: Sitting, Cuff Size: Adult Regular)   Pulse 73   LMP  (LMP Unknown)   SpO2 99%   Physical Exam  Constitutional:       Appearance: Normal appearance.   HENT:      Nose: Nose normal.      Mouth/Throat:      Mouth: Mucous membranes are moist.      Pharynx: Oropharynx is clear.   Eyes:      Conjunctiva/sclera: Conjunctivae normal.   Cardiovascular:      Rate and Rhythm: Normal rate and regular rhythm.   Pulmonary:      Effort: Pulmonary effort is normal. No respiratory distress.      Breath  sounds: Normal breath sounds. No wheezing.   Skin:     General: Skin is warm.   Neurological:      Mental Status: She is alert.   Psychiatric:         Mood and Affect: Mood normal.         Behavior: Behavior normal.           WORKUP: None    ASSESSMENT/PLAN:  Елена Mix is a 68 year old female here for evaluation of possible allergies.    1. Rhinoconjunctivitis - She reports worsening nasal congestion and rhinitis symptoms over the past year. Symptoms have led to several sinus infections in the past year that were treated with antibiotics. Wants to know if there is an underlying allergic trigger for her symptoms. She has had prior allergy testing and completed a short course of immunotherapy. Discussed management of allergic rhinoconjunctivitis and advised that if symptoms are persisting despite her current medications immunotherapy would be recommended. She is not interested in proceeding with allergy testing or pursuing immunotherapy at this time.    - continue with fexofenadine 180mg daily - may increase to twice daily if needed  - discontinue fluticasone and recommend trial of water based nasal steroid such as triamcinolone or mometasone - 2 sprays in each nostril daily  - add azelastine nasal spray for persistent symptoms - 1 to 2 sprays in each nostril daily  - use olopatadine eye drops daily as needed  - recommend use of preservative-free lubricating eye drops for comfort as needed      Follow-up in as needed      Staffed with Dr. Lencho Mcdonald DO  Pediatric Resident PGY-3  [facility]       This service has been performed in part by a resident under the direction of a teaching physician. I have personally examined this patient and was present for the resident's conversation with this patient.  I agree with the resident's documentation and plan of care.  I have reviewed and amended the note above.  The documentation accurately reflects my clinical observations, diagnoses,  treatment and follow-up plans.       Nate Musa MD, FAAAAI  Allergy/Immunology  [facility]    Consent was obtained from the patient to use an AI documentation tool in the creation of this note.    Chart documentation done in part with Dragon Voice Recognition Software. Although reviewed after completion, some word and grammatical errors may remain.      Again, thank you for allowing me to participate in the care of your patient.        Sincerely,        Nate Musa MD

## 2024-10-26 SDOH — HEALTH STABILITY: PHYSICAL HEALTH: ON AVERAGE, HOW MANY MINUTES DO YOU ENGAGE IN EXERCISE AT THIS LEVEL?: 30 MIN

## 2024-10-26 SDOH — HEALTH STABILITY: PHYSICAL HEALTH: ON AVERAGE, HOW MANY DAYS PER WEEK DO YOU ENGAGE IN MODERATE TO STRENUOUS EXERCISE (LIKE A BRISK WALK)?: 5 DAYS

## 2024-10-26 ASSESSMENT — SOCIAL DETERMINANTS OF HEALTH (SDOH): HOW OFTEN DO YOU GET TOGETHER WITH FRIENDS OR RELATIVES?: TWICE A WEEK

## 2024-10-31 ENCOUNTER — OFFICE VISIT (OUTPATIENT)
Dept: FAMILY MEDICINE | Facility: CLINIC | Age: 69
End: 2024-10-31
Attending: PHYSICIAN ASSISTANT
Payer: COMMERCIAL

## 2024-10-31 VITALS
OXYGEN SATURATION: 95 % | HEART RATE: 90 BPM | SYSTOLIC BLOOD PRESSURE: 152 MMHG | TEMPERATURE: 97.4 F | RESPIRATION RATE: 16 BRPM | DIASTOLIC BLOOD PRESSURE: 84 MMHG | BODY MASS INDEX: 20.73 KG/M2 | HEIGHT: 64 IN | WEIGHT: 121.4 LBS

## 2024-10-31 DIAGNOSIS — Z79.83 LONG TERM (CURRENT) USE OF BISPHOSPHONATES: ICD-10-CM

## 2024-10-31 DIAGNOSIS — E78.5 HYPERLIPIDEMIA LDL GOAL <130: ICD-10-CM

## 2024-10-31 DIAGNOSIS — R03.0 ELEVATED BLOOD PRESSURE READING WITHOUT DIAGNOSIS OF HYPERTENSION: ICD-10-CM

## 2024-10-31 DIAGNOSIS — R73.01 ELEVATED FASTING GLUCOSE: ICD-10-CM

## 2024-10-31 DIAGNOSIS — Z00.00 ENCOUNTER FOR MEDICARE ANNUAL WELLNESS EXAM: Primary | ICD-10-CM

## 2024-10-31 DIAGNOSIS — M85.80 OSTEOPENIA, UNSPECIFIED LOCATION: ICD-10-CM

## 2024-10-31 DIAGNOSIS — R79.89 HIGH SERUM VITAMIN D: ICD-10-CM

## 2024-10-31 PROCEDURE — G0439 PPPS, SUBSEQ VISIT: HCPCS | Performed by: FAMILY MEDICINE

## 2024-10-31 PROCEDURE — 99214 OFFICE O/P EST MOD 30 MIN: CPT | Mod: 25 | Performed by: FAMILY MEDICINE

## 2024-10-31 RX ORDER — ATORVASTATIN CALCIUM 10 MG/1
10 TABLET, FILM COATED ORAL DAILY
Qty: 90 TABLET | Refills: 3 | Status: SHIPPED | OUTPATIENT
Start: 2024-10-31

## 2024-10-31 ASSESSMENT — PAIN SCALES - GENERAL: PAINLEVEL_OUTOF10: NO PAIN (0)

## 2024-10-31 NOTE — PATIENT INSTRUCTIONS
Ok to discontinue alendronate when done with current supply,   Plan to recheck density in 2-3 years.     Tdap is due- get with pharmacy    Monitor blood pressure. Goal blood pressure  is < 140/90 but < 130/80 is ideal  - check blood pressure at rest for 5-10 minute  - avoid checking blood pressure within 30 minutes of caffeine or exercise.     Update me in a few weeks with blood pressure readings.      Tips to lower blood pressure  - lower the sodium intake in your diet. Aim for 2,000 mg per day or less  - exercise daily, especially aerobic exercise can lower blood pressure  - minimize caffeine and alcohol  - work on lowering your weight. Even small drops in weight can reduce blood pressure      Patient Education   Preventive Care Advice   This is general advice given by our system to help you stay healthy. However, your care team may have specific advice just for you. Please talk to your care team about your preventive care needs.  Nutrition  Eat 5 or more servings of fruits and vegetables each day.  Try wheat bread, brown rice and whole grain pasta (instead of white bread, rice, and pasta).  Get enough calcium and vitamin D. Check the label on foods and aim for 100% of the RDA (recommended daily allowance).  Lifestyle  Exercise at least 150 minutes each week  (30 minutes a day, 5 days a week).  Do muscle strengthening activities 2 days a week. These help control your weight and prevent disease.  No smoking.  Wear sunscreen to prevent skin cancer.  Have a dental exam and cleaning every 6 months.  Yearly exams  See your health care team every year to talk about:  Any changes in your health.  Any medicines your care team has prescribed.  Preventive care, family planning, and ways to prevent chronic diseases.  Shots (vaccines)   HPV shots (up to age 26), if you've never had them before.  Hepatitis B shots (up to age 59), if you've never had them before.  COVID-19 shot: Get this shot when it's due.  Flu shot: Get a flu  shot every year.  Tetanus shot: Get a tetanus shot every 10 years.  Pneumococcal, hepatitis A, and RSV shots: Ask your care team if you need these based on your risk.  Shingles shot (for age 50 and up)  General health tests  Diabetes screening:  Starting at age 35, Get screened for diabetes at least every 3 years.  If you are younger than age 35, ask your care team if you should be screened for diabetes.  Cholesterol test: At age 39, start having a cholesterol test every 5 years, or more often if advised.  Bone density scan (DEXA): At age 50, ask your care team if you should have this scan for osteoporosis (brittle bones).  Hepatitis C: Get tested at least once in your life.  STIs (sexually transmitted infections)  Before age 24: Ask your care team if you should be screened for STIs.  After age 24: Get screened for STIs if you're at risk. You are at risk for STIs (including HIV) if:  You are sexually active with more than one person.  You don't use condoms every time.  You or a partner was diagnosed with a sexually transmitted infection.  If you are at risk for HIV, ask about PrEP medicine to prevent HIV.  Get tested for HIV at least once in your life, whether you are at risk for HIV or not.  Cancer screening tests  Cervical cancer screening: If you have a cervix, begin getting regular cervical cancer screening tests starting at age 21.  Breast cancer scan (mammogram): If you've ever had breasts, begin having regular mammograms starting at age 40. This is a scan to check for breast cancer.  Colon cancer screening: It is important to start screening for colon cancer at age 45.  Have a colonoscopy test every 10 years (or more often if you're at risk) Or, ask your provider about stool tests like a FIT test every year or Cologuard test every 3 years.  To learn more about your testing options, visit:   .  For help making a decision, visit:   https://bit.ly/wn77816.  Prostate cancer screening test: If you have a prostate,  ask your care team if a prostate cancer screening test (PSA) at age 55 is right for you.  Lung cancer screening: If you are a current or former smoker ages 50 to 80, ask your care team if ongoing lung cancer screenings are right for you.  For informational purposes only. Not to replace the advice of your health care provider. Copyright   2023 [facility]. All rights reserved. Clinically reviewed by the [facility] Transitions Program. [employer] [id number] - REV 01/24.  Learning About Activities of Daily Living  What are activities of daily living?     Activities of daily living (ADLs) are the basic self-care tasks you do every day. These include eating, bathing, dressing, and moving around.  As you age, and if you have health problems, you may find that it's harder to do some of these tasks. If so, your doctor can suggest ideas that may help.  To measure what kind of help you may need, your doctor will ask how well you are able to do ADLs. Let your doctor know if there are any tasks that you are having trouble doing. This is an important first step to getting help. And when you have the help you need, you can stay as independent as possible.  How will a doctor assess your ADLs?  Asking about ADLs is part of a routine health checkup your doctor will likely do as you age. Your health check might be done in a doctor's office, in your home, or at a hospital. The goal is to find out if you are having any problems that could make it hard to care for yourself or that make it unsafe for you to be on your own.  To measure your ADLs, your doctor will ask how hard it is for you to do routine tasks. Your doctor may also want to know if you have changed the way you do a task because of a health problem. Your doctor may watch how you:  Walk back and forth.  Keep your balance while you stand or walk.  Move from sitting to standing or from a bed to a chair.  Button or unbutton a shirt or sweater.  Remove and put on  your shoes.  It's common to feel a little worried or anxious if you find you can't do all the things you used to be able to do. Talking with your doctor about ADLs is a way to make sure you're as safe as possible and able to care for yourself as well as you can. You may want to bring a caregiver, friend, or family member to your checkup. They can help you talk to your doctor.  Follow-up care is a key part of your treatment and safety. Be sure to make and go to all appointments, and call your doctor if you are having problems. It's also a good idea to know your test results and keep a list of the medicines you take.  Current as of: October 24, 2023  Content Version: 14.2 2024 [employer].   Care instructions adapted under license by your healthcare professional. If you have questions about a medical condition or this instruction, always ask your healthcare professional. [employer] disclaims any warranty or liability for your use of this information.    Learning About Sleeping Well  What does sleeping well mean?     Sleeping well means getting enough sleep to feel good and stay healthy. How much sleep is enough varies among people.  The number of hours you sleep and how you feel when you wake up are both important. If you do not feel refreshed, you probably need more sleep. Another sign of not getting enough sleep is feeling tired during the day.  Experts recommend that adults get at least 7 or more hours of sleep per day. Children and older adults need more sleep.  Why is getting enough sleep important?  Getting enough quality sleep is a basic part of good health. When your sleep suffers, your physical health, mood, and your thoughts can suffer too. You may find yourself feeling more grumpy or stressed. Not getting enough sleep also can lead to serious problems, including injury, accidents, anxiety, and depression.  What might cause poor sleeping?  Many things can cause sleep problems,  "including:  Changes to your sleep schedule.  Stress. Stress can be caused by fear about a single event, such as giving a speech. Or you may have ongoing stress, such as worry about work or school.  Depression, anxiety, and other mental or emotional conditions.  Changes in your sleep habits or surroundings. This includes changes that happen where you sleep, such as noise, light, or sleeping in a different bed. It also includes changes in your sleep pattern, such as having jet lag or working a late shift.  Health problems, such as pain, breathing problems, and restless legs syndrome.  Lack of regular exercise.  Using alcohol, nicotine, or caffeine before bed.  How can you help yourself?  Here are some tips that may help you sleep more soundly and wake up feeling more refreshed.  Your sleeping area   Use your bedroom only for sleeping and sex. A bit of light reading may help you fall asleep. But if it doesn't, do your reading elsewhere in the house. Try not to use your TV, computer, smartphone, or tablet while you are in bed.  Be sure your bed is big enough to stretch out comfortably, especially if you have a sleep partner.  Keep your bedroom quiet, dark, and cool. Use curtains, blinds, or a sleep mask to block out light. To block out noise, use earplugs, soothing music, or a \"white noise\" machine.  Your evening and bedtime routine   Create a relaxing bedtime routine. You might want to take a warm shower or bath, or listen to soothing music.  Go to bed at the same time every night. And get up at the same time every morning, even if you feel tired.  What to avoid   Limit caffeine (coffee, tea, caffeinated sodas) during the day, and don't have any for at least 6 hours before bedtime.  Avoid drinking alcohol before bedtime. Alcohol can cause you to wake up more often during the night.  Try not to smoke or use tobacco, especially in the evening. Nicotine can keep you awake.  Limit naps during the day, especially close to " "bedtime.  Avoid lying in bed awake for too long. If you can't fall asleep or if you wake up in the middle of the night and can't get back to sleep within about 20 minutes, get out of bed and go to another room until you feel sleepy.  Avoid taking medicine right before bed that may keep you awake or make you feel hyper or energized. Your doctor can tell you if your medicine may do this and if you can take it earlier in the day.  If you can't sleep   Imagine yourself in a peaceful, pleasant scene. Focus on the details and feelings of being in a place that is relaxing.  Get up and do a quiet or boring activity until you feel sleepy.  Avoid drinking any liquids before going to bed to help prevent waking up often to use the bathroom.  Where can you learn more?  Go to https://www.[employer].net/patiented  Enter J942 in the search box to learn more about \"Learning About Sleeping Well.\"  Current as of: July 10, 2023  Content Version: 14.2 2024 [facility] [employer].   Care instructions adapted under license by your healthcare professional. If you have questions about a medical condition or this instruction, always ask your healthcare professional. Healthwise, Incorporated disclaims any warranty or liability for your use of this information.       "

## 2024-10-31 NOTE — PROGRESS NOTES
Preventive Care Visit  [facility]  Jana Arin Ramirez MD, Family Medicine  Oct 31, 2024      Assessment & Plan     Encounter for Medicare annual wellness exam      Elevated blood pressure reading without diagnosis of hypertension  This is first reading that is elevated. She will start to monitor home readings and update me in a few weeks with results. If bp consistently elevated will start hydrochlorothiazide 12.5 mg daily - reviewed med/benefits/AE. Reviewed lifestyle measures to control bp  - Comprehensive metabolic panel (BMP + Alb, Alk Phos, ALT, AST, Total. Bili, TP); Future    Elevated fasting glucose  Hx of, had adjusted diet/exercise and has lost wt. Will continue to monitor. Could consider metformin if needed in future  - Hemoglobin A1c; Future    Hyperlipidemia LDL goal <130  On statin  - Lipid panel reflex to direct LDL Fasting; Future  - atorvastatin (LIPITOR) 10 MG tablet; Take 1 tablet (10 mg) by mouth daily.    High serum vitamin D  Hx of. Need to assess that she is on correct supplementation  - Vitamin D Deficiency; Future    Osteopenia, unspecified location  Long term (current) use of bisphosphonates  Not on alendronate for total of 10 years. Ok to discontinue and will repeat dexa in 2 years. Continue adequate calcium/D/strength training.   - Vitamin D Deficiency; Future            Counseling  Appropriate preventive services were addressed with this patient via screening, questionnaire, or discussion as appropriate for fall prevention, nutrition, physical activity, Tobacco-use cessation, social engagement, weight loss and cognition.  Checklist reviewing preventive services available has been given to the patient.  Reviewed patient's diet, addressing concerns and/or questions.   She is at risk for psychosocial distress and has been provided with information to reduce risk.   Discussed possible causes of fatigue. Patient reported safety concerns were addressed  today.        Subjective   Enedelia is a 69 year old, presenting for the following:  Wellness Visit        10/31/2024    12:51 PM   Additional Questions   Roomed by Octavio   Accompanied by Self         10/31/2024    12:51 PM   Patient Reported Additional Medications   Patient reports taking the following new medications None           HPI    Started statin and noted glucose went up a bit  Eating leaner/better  Got a diabetic cook book. Exercising more.   Lost weight.     Working with ophthalmology for cataracts. Surgery planned for 2025    Mvi, vit D 5,000 international unit(s) once per week    Fosamax for 7 years. Stopped for holiday. Restarted 3 years ago.   Started more weight wt lifting this year 5 days a week.   Walking her dog.      Health Care Directive  Patient does not have a Health Care Directive: Discussed advance care planning with patient; information given to patient to review.      10/26/2024   General Health   How would you rate your overall physical health? Excellent   Feel stress (tense, anxious, or unable to sleep) Only a little      (!) STRESS CONCERN      10/26/2024   Nutrition   Diet: Low salt    Low fat/cholesterol    Carbohydrate counting    Gluten-free/reduced       Multiple values from one day are sorted in reverse-chronological order         10/26/2024   Exercise   Days per week of moderate/strenous exercise 5 days   Average minutes spent exercising at this level 30 min            10/26/2024   Social Factors   Frequency of gathering with friends or relatives Twice a week   Worry food won't last until get money to buy more No   Food not last or not have enough money for food? No   Do you have housing? (Housing is defined as stable permanent housing and does not include staying ouside in a car, in a tent, in an abandoned building, in an overnight shelter, or couch-surfing.) Yes   Are you worried about losing your housing? No   Lack of transportation? No   Unable to get utilities  (heat,electricity)? No            10/26/2024   Fall Risk   Fallen 2 or more times in the past year? No     No    Trouble with walking or balance? No     No        Patient-reported    Multiple values from one day are sorted in reverse-chronological order          10/26/2024   Activities of Daily Living- Home Safety   Needs help with the following daily activites None of the above   Safety concerns in the home Throw rugs in the hallway            10/26/2024   Dental   Dentist two times every year? Yes            10/26/2024   Hearing Screening   Hearing concerns? None of the above            10/26/2024   Driving Risk Screening   Patient/family members have concerns about driving No            10/26/2024   General Alertness/Fatigue Screening   Have you been more tired than usual lately? (!) YES            10/26/2024   Urinary Incontinence Screening   Bothered by leaking urine in past 6 months No            10/26/2024   TB Screening   Were you born outside of the [place]? No            Today's PHQ-2 Score:       10/31/2024    11:35 AM   PHQ-2 ( 1999 [employer])   Q1: Little interest or pleasure in doing things 0    Q2: Feeling down, depressed or hopeless 0    PHQ-2 Score 0    Q1: Little interest or pleasure in doing things Not at all   Q2: Feeling down, depressed or hopeless Not at all   PHQ-2 Score 0       Patient-reported           10/26/2024   Substance Use   Alcohol more than 3/day or more than 7/wk No   Do you have a current opioid prescription? No   How severe/bad is pain from 1 to 10? 1/10   Do you use any other substances recreationally? No        Social History     Tobacco Use    Smoking status: Never    Smokeless tobacco: Never   Vaping Use    Vaping status: Never Used   Substance Use Topics    Alcohol use: Yes     Comment: 1 glass of wine 2-3 x's a week.    Drug use: No           12/20/2023   LAST FHS-7 RESULTS   1st degree relative breast or ovarian cancer No   Any relative bilateral breast cancer No   Any male have  breast cancer No   Any ONE woman have BOTH breast AND ovarian cancer No   Any woman with breast cancer before 50yrs No   2 or more relatives with breast AND/OR ovarian cancer No   2 or more relatives with breast AND/OR bowel cancer No           Mammogram Screening - Mammogram every 1-2 years updated in Health Maintenance based on mutual decision making    ASCVD Risk   The 10-year ASCVD risk score (Alex BETTENCOURT, et al., 2019) is: 15.3%    Values used to calculate the score:      Age: 69 years      Sex: Female      Is Non- : No      Diabetic: No      Tobacco smoker: No      Systolic Blood Pressure: 162 mmHg      Is BP treated: No      HDL Cholesterol: 71 mg/dL      Total Cholesterol: 182 mg/dL            Reviewed and updated as needed this visit by Provider                      Current providers sharing in care for this patient include:  Patient Care Team:  Jana Ramirez MD as PCP - General (Family Practice)  Juno Waller MD as MD (Ophthalmology)  Cecy Franks PA-C as Assigned PCP  Maia Haji MD as MD (Otolaryngology)  Nate Musa MD as MD (Allergy & Immunology)  Juno Waller MD as Assigned Surgical Provider  Nate Musa MD as Assigned Allergy Provider    The following health maintenance items are reviewed in Epic and correct as of today:  Health Maintenance   Topic Date Due    DTAP/TDAP/TD IMMUNIZATION (2 - Td or Tdap) 06/27/2024    LIPID  10/27/2024    ANNUAL REVIEW OF HM ORDERS  10/27/2024    GLUCOSE  10/27/2024    MEDICARE ANNUAL WELLNESS VISIT  10/27/2024    FALL RISK ASSESSMENT  10/31/2025    MAMMO SCREENING  12/20/2025    DEXA  01/08/2026    COLORECTAL CANCER SCREENING  02/23/2026    ADVANCE CARE PLANNING  10/27/2028    HEPATITIS C SCREENING  Completed    PHQ-2 (once per calendar year)  Completed    INFLUENZA VACCINE  Completed    Pneumococcal Vaccine: 65+ Years  Completed    ZOSTER IMMUNIZATION  Completed    RSV VACCINE   "Completed    COVID-19 Vaccine  Completed    HPV IMMUNIZATION  Aged Out    MENINGITIS IMMUNIZATION  Aged Out    RSV MONOCLONAL ANTIBODY  Aged Out         Review of Systems  Constitutional, neuro, ENT, endocrine, pulmonary, cardiac, gastrointestinal, genitourinary, musculoskeletal, integument and psychiatric systems are negative, except as otherwise noted.     Objective    Exam  BP (!) 162/95 (BP Location: Right arm, Patient Position: Sitting, Cuff Size: Adult Regular)   Pulse 90   Temp 97.4  F (36.3  C) (Oral)   Resp 16   Ht 1.626 m (5' 4\")   Wt 55.1 kg (121 lb 6.4 oz)   LMP  (LMP Unknown)   SpO2 95%   BMI 20.84 kg/m     Estimated body mass index is 20.84 kg/m  as calculated from the following:    Height as of this encounter: 1.626 m (5' 4\").    Weight as of this encounter: 55.1 kg (121 lb 6.4 oz).    Physical Exam  GENERAL: alert and no distress  EYES: Eyes grossly normal to inspection, PERRL and conjunctivae and sclerae normal  HENT: ear canals and TM's normal, nose and mouth without ulcers or lesions  NECK: no adenopathy, no asymmetry, masses, or scars  RESP: lungs clear to auscultation - no rales, rhonchi or wheezes  BREAST: normal without masses, tenderness or nipple discharge and no palpable axillary masses or adenopathy  CV: regular rate and rhythm, normal S1 S2, no S3 or S4, no murmur, click or rub, no peripheral edema  ABDOMEN: soft, nontender, no hepatosplenomegaly, no masses and bowel sounds normal  MS: no gross musculoskeletal defects noted, no edema  SKIN: no suspicious lesions or rashes  NEURO: Normal strength and tone, mentation intact and speech normal  PSYCH: mentation appears normal, affect normal/bright             10/31/2024   Mini Cog   Clock Draw Score 2 Normal   3 Item Recall 3 objects recalled   Mini Cog Total Score 5                 Signed Electronically by: Jana Ramirez MD    "

## 2024-11-14 ENCOUNTER — LAB (OUTPATIENT)
Dept: LAB | Facility: CLINIC | Age: 69
End: 2024-11-14
Payer: COMMERCIAL

## 2024-11-14 DIAGNOSIS — R03.0 ELEVATED BLOOD PRESSURE READING WITHOUT DIAGNOSIS OF HYPERTENSION: ICD-10-CM

## 2024-11-14 DIAGNOSIS — Z79.83 LONG TERM (CURRENT) USE OF BISPHOSPHONATES: ICD-10-CM

## 2024-11-14 DIAGNOSIS — R79.89 HIGH SERUM VITAMIN D: ICD-10-CM

## 2024-11-14 DIAGNOSIS — Z13.1 SCREENING FOR DIABETES MELLITUS: Primary | ICD-10-CM

## 2024-11-14 DIAGNOSIS — R73.01 ELEVATED FASTING GLUCOSE: ICD-10-CM

## 2024-11-14 DIAGNOSIS — E78.5 HYPERLIPIDEMIA LDL GOAL <130: ICD-10-CM

## 2024-11-14 LAB
ALBUMIN SERPL BCG-MCNC: 4.7 G/DL (ref 3.5–5.2)
ALP SERPL-CCNC: 62 U/L (ref 40–150)
ALT SERPL W P-5'-P-CCNC: 19 U/L (ref 0–50)
ANION GAP SERPL CALCULATED.3IONS-SCNC: 11 MMOL/L (ref 7–15)
AST SERPL W P-5'-P-CCNC: 22 U/L (ref 0–45)
BILIRUB SERPL-MCNC: 0.6 MG/DL
BUN SERPL-MCNC: 15.6 MG/DL (ref 8–23)
CALCIUM SERPL-MCNC: 10 MG/DL (ref 8.8–10.4)
CHLORIDE SERPL-SCNC: 104 MMOL/L (ref 98–107)
CHOLEST SERPL-MCNC: 205 MG/DL
CREAT SERPL-MCNC: 0.78 MG/DL (ref 0.51–0.95)
EGFRCR SERPLBLD CKD-EPI 2021: 81 ML/MIN/1.73M2
EST. AVERAGE GLUCOSE BLD GHB EST-MCNC: 105 MG/DL
FASTING STATUS PATIENT QL REPORTED: YES
GLUCOSE SERPL-MCNC: 85 MG/DL (ref 70–99)
GLUCOSE SERPL-MCNC: 85 MG/DL (ref 70–99)
HBA1C MFR BLD: 5.3 % (ref 0–5.6)
HCO3 SERPL-SCNC: 25 MMOL/L (ref 22–29)
HDLC SERPL-MCNC: 77 MG/DL
LDLC SERPL CALC-MCNC: 110 MG/DL
NONHDLC SERPL-MCNC: 128 MG/DL
POTASSIUM SERPL-SCNC: 4.7 MMOL/L (ref 3.4–5.3)
PROT SERPL-MCNC: 7.6 G/DL (ref 6.4–8.3)
SODIUM SERPL-SCNC: 140 MMOL/L (ref 135–145)
TRIGL SERPL-MCNC: 89 MG/DL
VIT D+METAB SERPL-MCNC: 63 NG/ML (ref 20–50)

## 2024-11-14 PROCEDURE — 83036 HEMOGLOBIN GLYCOSYLATED A1C: CPT

## 2024-11-14 PROCEDURE — 36415 COLL VENOUS BLD VENIPUNCTURE: CPT

## 2024-11-14 PROCEDURE — 82306 VITAMIN D 25 HYDROXY: CPT | Mod: GZ

## 2024-11-14 PROCEDURE — 80061 LIPID PANEL: CPT

## 2024-11-14 PROCEDURE — 80053 COMPREHEN METABOLIC PANEL: CPT

## 2024-11-15 DIAGNOSIS — R79.89 HIGH SERUM VITAMIN D: Primary | ICD-10-CM

## 2024-11-15 DIAGNOSIS — M85.80 OSTEOPENIA, UNSPECIFIED LOCATION: ICD-10-CM

## 2024-11-15 NOTE — RESULT ENCOUNTER NOTE
Enedelia,  It was a pleasure to see you in the office recently.   -Vitamin D is again slightly elevated.  Please stop all vitamin D supplements at this time.  Lets plan to recheck vitamin D in 2 to 3 months to see where things are at.  Many times when the vitamin D level is elevated it does take quite a bit of time for this to trend down.  Thankfully, the calcium level is looking okay which is one of our concerns when vitamin D gets too high.  - kidney, liver and electrolyte panel is normal.  -I am happy to report your diabetes screening test, glucose and A1c, are both normal.  -Cholesterol panel shows a small jump up in the LDL (bad cholesterol).  Please continue the atorvastatin but we may want to consider increasing the dose in the future if LDL (bad cholesterol) continues to climb up.  Please MyChart or call if you have any concerns or questions.   Sincerely,  Jana Ramirez MD

## 2024-11-17 ENCOUNTER — MYC MEDICAL ADVICE (OUTPATIENT)
Dept: FAMILY MEDICINE | Facility: CLINIC | Age: 69
End: 2024-11-17
Payer: COMMERCIAL

## 2024-12-03 DIAGNOSIS — M85.80 OSTEOPENIA, UNSPECIFIED LOCATION: ICD-10-CM

## 2024-12-03 RX ORDER — ALENDRONATE SODIUM 35 MG/1
35 TABLET ORAL
Qty: 12 TABLET | Refills: 2 | Status: SHIPPED | OUTPATIENT
Start: 2024-12-03

## 2024-12-03 NOTE — TELEPHONE ENCOUNTER
Clinic RN: Please investigate patient's chart or contact patient if the information cannot be found because  please determine pharmacy then route back to refill pool.  Shilpi Velasco RN, BSN  [facility]

## 2024-12-23 ENCOUNTER — HOSPITAL ENCOUNTER (OUTPATIENT)
Dept: MAMMOGRAPHY | Facility: CLINIC | Age: 69
Discharge: HOME | End: 2024-12-23
Attending: FAMILY MEDICINE | Admitting: FAMILY MEDICINE
Payer: COMMERCIAL

## 2024-12-23 DIAGNOSIS — Z12.31 VISIT FOR SCREENING MAMMOGRAM: ICD-10-CM

## 2024-12-23 PROCEDURE — 77063 BREAST TOMOSYNTHESIS BI: CPT

## 2024-12-23 PROCEDURE — 77067 SCR MAMMO BI INCL CAD: CPT

## 2024-12-26 DIAGNOSIS — E78.5 HYPERLIPIDEMIA LDL GOAL <130: ICD-10-CM

## 2024-12-26 RX ORDER — ATORVASTATIN CALCIUM 10 MG/1
10 TABLET, FILM COATED ORAL DAILY
Qty: 90 TABLET | Refills: 2 | Status: SHIPPED | OUTPATIENT
Start: 2024-12-26

## 2025-03-08 ENCOUNTER — VIRTUAL VISIT (OUTPATIENT)
Dept: URGENT CARE | Facility: CLINIC | Age: 70
End: 2025-03-08
Payer: COMMERCIAL

## 2025-03-08 DIAGNOSIS — J01.90 ACUTE SINUSITIS WITH SYMPTOMS > 10 DAYS: Primary | ICD-10-CM

## 2025-03-08 PROCEDURE — 98005 SYNCH AUDIO-VIDEO EST LOW 20: CPT

## 2025-03-08 NOTE — PATIENT INSTRUCTIONS
Drink plenty of fluids, rest, warm compresses on face  Netti Pot 1x in the morning 1x at night  or saline rinses or saline nasal spray such as Oceans spray  Flonase (Fluticasone) 2x each nostril twice a day for two weeks, then 2x  each nostril once a day until symptoms resolved  Benadryl (diphenhydramine) at bedtime to help with congestion and sleep

## 2025-03-08 NOTE — PROGRESS NOTES
Елена is a 69 year old female who presents for a billable video visit.    ASSESSMENT/PLAN:  Diagnoses and all orders for this visit:    Acute sinusitis with symptoms > 10 days  -     amoxicillin-clavulanate (AUGMENTIN) 875-125 MG tablet; Take 1 tablet by mouth 2 times daily for 7 days.      Saline nasal rinses and Flonase advised to improve sinus hygiene    Follow up with primary care provider with any problems, questions or concerns or if symptoms worsen or fail to improve. Patient agreed to plan and verbalized understanding.     SUBJECTIVE:    Patient reports sinus symptoms which started with common cold symptoms, 2 weeks ago. She reports symptoms started with sore throat, runny nose, chills, congestion. She has worsening congestion and dry cough. She has been using saline rinses, steam and taking ibuprofen with some improvement in symptoms. She is concerned about a sinus infection. She denies fever.     ROS: Pertinent ROS neg other than the symptoms noted above in the HPI.     OBJECTIVE:  Vitals not done due to this being a virtual visit    GENERAL: healthy, alert and no distress  EYES: Eyes grossly normal to inspection,conjunctivae and sclerae normal  RESP: Able to speak in complete sentences, no audible wheeze or cough  SKIN: no suspicious lesions or rashes  NEURO: mentation intact and speech normal  PSYCH: mentation appears normal, affect normal/bright    Video-Visit Details    Type of service:  Video Visit  Video Start Time: 12:58 pm  Video End Time: 1:03 pm    Originating Location: Home    Distant Location:  [facility] VIRTUAL URGENT CARE     Platform used for Video Visit: Daniela Cason PA-C

## 2025-04-12 ENCOUNTER — HEALTH MAINTENANCE LETTER (OUTPATIENT)
Age: 70
End: 2025-04-12

## (undated) DEVICE — KIT ENDO FIRST STEP DISINFECTANT 200ML W/POUCH EP-4

## (undated) DEVICE — PREP CHLORAPREP 26ML TINTED ORANGE  260815

## (undated) DEVICE — PAD CHUX UNDERPAD 23X24" 7136

## (undated) RX ORDER — DIPHENHYDRAMINE HYDROCHLORIDE 50 MG/ML
INJECTION INTRAMUSCULAR; INTRAVENOUS
Status: DISPENSED
Start: 2021-02-23

## (undated) RX ORDER — ONDANSETRON 2 MG/ML
INJECTION INTRAMUSCULAR; INTRAVENOUS
Status: DISPENSED
Start: 2021-02-23

## (undated) RX ORDER — SCOLOPAMINE TRANSDERMAL SYSTEM 1 MG/1
PATCH, EXTENDED RELEASE TRANSDERMAL
Status: DISPENSED
Start: 2021-02-23

## (undated) RX ORDER — LIDOCAINE HYDROCHLORIDE 20 MG/ML
JELLY TOPICAL
Status: DISPENSED
Start: 2021-02-23

## (undated) RX ORDER — FENTANYL CITRATE 50 UG/ML
INJECTION, SOLUTION INTRAMUSCULAR; INTRAVENOUS
Status: DISPENSED
Start: 2021-02-23